# Patient Record
Sex: MALE | Race: WHITE | NOT HISPANIC OR LATINO | Employment: OTHER | URBAN - METROPOLITAN AREA
[De-identification: names, ages, dates, MRNs, and addresses within clinical notes are randomized per-mention and may not be internally consistent; named-entity substitution may affect disease eponyms.]

---

## 2018-08-30 ENCOUNTER — COMMUNICATION - HEALTHEAST (OUTPATIENT)
Dept: SCHEDULING | Facility: CLINIC | Age: 68
End: 2018-08-30

## 2019-03-25 ENCOUNTER — OFFICE VISIT - HEALTHEAST (OUTPATIENT)
Dept: FAMILY MEDICINE | Facility: CLINIC | Age: 69
End: 2019-03-25

## 2019-03-25 DIAGNOSIS — Z00.00 ROUTINE GENERAL MEDICAL EXAMINATION AT A HEALTH CARE FACILITY: ICD-10-CM

## 2019-03-25 DIAGNOSIS — K40.30 UNILATERAL INGUINAL HERNIA WITH OBSTRUCTION AND WITHOUT GANGRENE, RECURRENCE NOT SPECIFIED: ICD-10-CM

## 2019-03-25 DIAGNOSIS — R31.0 GROSS HEMATURIA: ICD-10-CM

## 2019-03-25 LAB
ALBUMIN SERPL-MCNC: 4.2 G/DL (ref 3.5–5)
ALBUMIN UR-MCNC: NEGATIVE MG/DL
ALP SERPL-CCNC: 72 U/L (ref 45–120)
ALT SERPL W P-5'-P-CCNC: 24 U/L (ref 0–45)
AMORPH CRY #/AREA URNS HPF: ABNORMAL /[HPF]
ANION GAP SERPL CALCULATED.3IONS-SCNC: 9 MMOL/L (ref 5–18)
APPEARANCE UR: ABNORMAL
AST SERPL W P-5'-P-CCNC: 32 U/L (ref 0–40)
BACTERIA #/AREA URNS HPF: ABNORMAL HPF
BILIRUB SERPL-MCNC: 1.7 MG/DL (ref 0–1)
BILIRUB UR QL STRIP: NEGATIVE
BUN SERPL-MCNC: 14 MG/DL (ref 8–22)
CALCIUM SERPL-MCNC: 10.9 MG/DL (ref 8.5–10.5)
CHLORIDE BLD-SCNC: 104 MMOL/L (ref 98–107)
CHOLEST SERPL-MCNC: 222 MG/DL
CO2 SERPL-SCNC: 28 MMOL/L (ref 22–31)
COLOR UR AUTO: YELLOW
CREAT SERPL-MCNC: 0.9 MG/DL (ref 0.7–1.3)
ERYTHROCYTE [DISTWIDTH] IN BLOOD BY AUTOMATED COUNT: 12 % (ref 11–14.5)
FASTING STATUS PATIENT QL REPORTED: YES
GFR SERPL CREATININE-BSD FRML MDRD: >60 ML/MIN/1.73M2
GLUCOSE BLD-MCNC: 79 MG/DL (ref 70–125)
GLUCOSE UR STRIP-MCNC: NEGATIVE MG/DL
HBA1C MFR BLD: 5.1 % (ref 3.5–6)
HCT VFR BLD AUTO: 50.7 % (ref 40–54)
HDLC SERPL-MCNC: 84 MG/DL
HGB BLD-MCNC: 16.6 G/DL (ref 14–18)
HGB UR QL STRIP: ABNORMAL
KETONES UR STRIP-MCNC: NEGATIVE MG/DL
LDLC SERPL CALC-MCNC: 125 MG/DL
LEUKOCYTE ESTERASE UR QL STRIP: NEGATIVE
MCH RBC QN AUTO: 31.8 PG (ref 27–34)
MCHC RBC AUTO-ENTMCNC: 32.8 G/DL (ref 32–36)
MCV RBC AUTO: 97 FL (ref 80–100)
NITRATE UR QL: NEGATIVE
PH UR STRIP: 7.5 [PH] (ref 5–8)
PLATELET # BLD AUTO: 271 THOU/UL (ref 140–440)
PMV BLD AUTO: 7 FL (ref 7–10)
POTASSIUM BLD-SCNC: 4.3 MMOL/L (ref 3.5–5)
PROT SERPL-MCNC: 6.9 G/DL (ref 6–8)
PSA SERPL-MCNC: 1.3 NG/ML (ref 0–4.5)
RBC # BLD AUTO: 5.24 MILL/UL (ref 4.4–6.2)
RBC #/AREA URNS AUTO: ABNORMAL HPF
SODIUM SERPL-SCNC: 141 MMOL/L (ref 136–145)
SP GR UR STRIP: 1.01 (ref 1–1.03)
SQUAMOUS #/AREA URNS AUTO: ABNORMAL LPF
TRIGL SERPL-MCNC: 64 MG/DL
UROBILINOGEN UR STRIP-ACNC: ABNORMAL
WBC #/AREA URNS AUTO: ABNORMAL HPF
WBC: 4.9 THOU/UL (ref 4–11)

## 2019-03-25 ASSESSMENT — MIFFLIN-ST. JEOR: SCORE: 1488.16

## 2019-03-26 ENCOUNTER — HOSPITAL ENCOUNTER (OUTPATIENT)
Dept: CT IMAGING | Facility: CLINIC | Age: 69
Discharge: HOME | End: 2019-03-26
Attending: FAMILY MEDICINE

## 2019-03-26 ENCOUNTER — COMMUNICATION - HEALTHEAST (OUTPATIENT)
Dept: FAMILY MEDICINE | Facility: CLINIC | Age: 69
End: 2019-03-26

## 2019-03-26 DIAGNOSIS — R31.0 GROSS HEMATURIA: ICD-10-CM

## 2019-04-02 ENCOUNTER — OFFICE VISIT - HEALTHEAST (OUTPATIENT)
Dept: SURGERY | Facility: CLINIC | Age: 69
End: 2019-04-02

## 2019-04-02 DIAGNOSIS — K40.90 NON-RECURRENT INGUINAL HERNIA WITHOUT OBSTRUCTION OR GANGRENE, UNSPECIFIED LATERALITY: ICD-10-CM

## 2019-04-02 ASSESSMENT — MIFFLIN-ST. JEOR: SCORE: 1507.21

## 2019-04-03 ENCOUNTER — OFFICE VISIT - HEALTHEAST (OUTPATIENT)
Dept: FAMILY MEDICINE | Facility: CLINIC | Age: 69
End: 2019-04-03

## 2019-04-03 DIAGNOSIS — Z12.11 SCREEN FOR COLON CANCER: ICD-10-CM

## 2019-04-03 DIAGNOSIS — N21.0 CALCIUM STONE OF BLADDER: ICD-10-CM

## 2019-04-30 ENCOUNTER — COMMUNICATION - HEALTHEAST (OUTPATIENT)
Dept: FAMILY MEDICINE | Facility: CLINIC | Age: 69
End: 2019-04-30

## 2019-05-02 ENCOUNTER — RECORDS - HEALTHEAST (OUTPATIENT)
Dept: ADMINISTRATIVE | Facility: OTHER | Age: 69
End: 2019-05-02

## 2019-05-03 ENCOUNTER — OFFICE VISIT - HEALTHEAST (OUTPATIENT)
Dept: FAMILY MEDICINE | Facility: CLINIC | Age: 69
End: 2019-05-03

## 2019-05-03 DIAGNOSIS — N21.0 BLADDER STONE: ICD-10-CM

## 2019-05-03 DIAGNOSIS — K40.90 HERNIA, INGUINAL, RIGHT: ICD-10-CM

## 2019-05-03 DIAGNOSIS — Z01.818 PREOPERATIVE EXAMINATION: ICD-10-CM

## 2019-05-03 LAB — HGB BLD-MCNC: 15.1 G/DL (ref 14–18)

## 2019-05-03 ASSESSMENT — MIFFLIN-ST. JEOR: SCORE: 1498.02

## 2019-05-06 LAB
ATRIAL RATE - MUSE: 62 BPM
DIASTOLIC BLOOD PRESSURE - MUSE: NORMAL MMHG
INTERPRETATION ECG - MUSE: NORMAL
P AXIS - MUSE: 60 DEGREES
PR INTERVAL - MUSE: 168 MS
QRS DURATION - MUSE: 82 MS
QT - MUSE: 370 MS
QTC - MUSE: 375 MS
R AXIS - MUSE: -25 DEGREES
SYSTOLIC BLOOD PRESSURE - MUSE: NORMAL MMHG
T AXIS - MUSE: 10 DEGREES
VENTRICULAR RATE- MUSE: 62 BPM

## 2019-05-07 ASSESSMENT — MIFFLIN-ST. JEOR: SCORE: 1497.57

## 2019-05-08 ENCOUNTER — SURGERY - HEALTHEAST (OUTPATIENT)
Dept: SURGERY | Facility: HOSPITAL | Age: 69
End: 2019-05-08

## 2019-05-08 ENCOUNTER — ANESTHESIA - HEALTHEAST (OUTPATIENT)
Dept: SURGERY | Facility: HOSPITAL | Age: 69
End: 2019-05-08

## 2019-05-20 ENCOUNTER — ANESTHESIA - HEALTHEAST (OUTPATIENT)
Dept: SURGERY | Facility: AMBULATORY SURGERY CENTER | Age: 69
End: 2019-05-20

## 2019-05-20 ASSESSMENT — MIFFLIN-ST. JEOR
SCORE: 1502.11
SCORE: 1502.11

## 2019-05-21 ENCOUNTER — HOSPITAL ENCOUNTER (OUTPATIENT)
Dept: SURGERY | Facility: AMBULATORY SURGERY CENTER | Age: 69
Discharge: HOME | End: 2019-05-21
Attending: SURGERY | Admitting: SURGERY

## 2019-05-21 ENCOUNTER — SURGERY - HEALTHEAST (OUTPATIENT)
Dept: SURGERY | Facility: AMBULATORY SURGERY CENTER | Age: 69
End: 2019-05-21

## 2019-05-21 DIAGNOSIS — K40.90 HERNIA, INGUINAL, RIGHT: ICD-10-CM

## 2019-05-21 RX ORDER — TRAMADOL HYDROCHLORIDE 50 MG/1
50-100 TABLET ORAL EVERY 6 HOURS PRN
Qty: 20 TABLET | Refills: 0 | Status: SHIPPED | OUTPATIENT
Start: 2019-05-21

## 2019-05-21 ASSESSMENT — MIFFLIN-ST. JEOR
SCORE: 1502.11
SCORE: 1502.11

## 2019-06-04 ENCOUNTER — OFFICE VISIT - HEALTHEAST (OUTPATIENT)
Dept: SURGERY | Facility: CLINIC | Age: 69
End: 2019-06-04

## 2019-06-04 DIAGNOSIS — S30.1XXA ABDOMINAL WALL SEROMA, INITIAL ENCOUNTER: ICD-10-CM

## 2019-06-04 ASSESSMENT — MIFFLIN-ST. JEOR: SCORE: 1496.66

## 2021-05-27 NOTE — PROGRESS NOTES
HPI:  I am consulted by Jame Plunkett MD in this 68 y.o. male regarding an inguinal hernia. He has noted this for the past year. He has discomfort and a bulge. The pain is mild.    No Known Allergies    No current outpatient medications on file.    Past Medical History:   Diagnosis Date     Concussion     3 yrs old       Past Surgical History:   Procedure Laterality Date     KIDNEY STONE SURGERY       VASECTOMY  1982       Social History     Socioeconomic History     Marital status:      Spouse name: Not on file     Number of children: Not on file     Years of education: Not on file     Highest education level: Not on file   Occupational History     Not on file   Social Needs     Financial resource strain: Not on file     Food insecurity:     Worry: Not on file     Inability: Not on file     Transportation needs:     Medical: Not on file     Non-medical: Not on file   Tobacco Use     Smoking status: Former Smoker     Last attempt to quit:      Years since quittin.2     Smokeless tobacco: Never Used   Substance and Sexual Activity     Alcohol use: No     Drug use: No     Sexual activity: Not on file   Lifestyle     Physical activity:     Days per week: Not on file     Minutes per session: Not on file     Stress: Not on file   Relationships     Social connections:     Talks on phone: Not on file     Gets together: Not on file     Attends Yazidism service: Not on file     Active member of club or organization: Not on file     Attends meetings of clubs or organizations: Not on file     Relationship status: Not on file     Intimate partner violence:     Fear of current or ex partner: Not on file     Emotionally abused: Not on file     Physically abused: Not on file     Forced sexual activity: Not on file   Other Topics Concern     Not on file   Social History Narrative     Not on file       Review of Systems - Negative except he has hematuria, and has just found out that he has bladder and kidney  "stones.    /64 (Patient Site: Right Arm, Patient Position: Sitting, Cuff Size: Adult Regular)   Pulse 66   Ht 5' 11.5\" (1.816 m)   Wt 157 lb (71.2 kg)   SpO2 99%   BMI 21.59 kg/m    Body mass index is 21.59 kg/m .    EXAM:  GENERAL: Thin male in no distress.  CARDIAC: RRR w/out murmur   CHEST/LUNG: Clear  ABDOMEN:  Right inguinal hernia. The left side is normal. Abdomen soft and nontender.  GENITAL: Both testicles descended without masses      Assessment/Plan: This patient has a  right inguinal hernia. I discussed this at length with him.  I went over conservative management as well as surgical treatment of this. I will plan on doing this via an open approach. I went over the small risks of surgery including but not limited to bleeding and infection. I discussed the expected recovery time as well. He will have an activity restriction for 4 weeks of no lifting over 20 pounds and no strenuous activity.   I suggested that he deal with his bladder and kidney stones before we repair the hernia. He will contact us to have this scheduled once his nephrolithiasis has been treated.      Anil Andres MD  [facility] Department of Surgery  "

## 2021-05-27 NOTE — PROGRESS NOTES
Assessment:     1. Screen for colon cancer  Ambulatory referral for Colonoscopy   2. Calcium stone of bladder  Ambulatory referral to Urology       Plan:     1. Screen for colon cancer  Referral placed  - Ambulatory referral for Colonoscopy    2. Calcium stone of bladder  Patient will need this large bladder stone resolved before he can have his hernia repaired referral will be made  - Ambulatory referral to Urology      Subjective:   Patient is having intermittent hematuria especially after he walks or exercises and I think it is related to the large 2 cm bladder stone; he will need urological consultation and options explained.  He was seen by Dr. Andres of general surgery for his large inguinal hernia which will have to be deferred until the bladder stone problem is resolved.  Patient wanted to try natural methods to dissolve the stone but I said this is impossible based on the size of it and the fact that it is causing such symptoms such as microhematuria and nocturia and urinary frequency.  Patient understands referral will be made options were discussed.25 minute visit    Review of Systems: A complete 14 point review of systems was obtained and is negative or as stated in the history of present illness.    Past Medical History:   Diagnosis Date     Concussion     3 yrs old     Family History   Problem Relation Age of Onset     Stroke Mother      Heart disease Mother      Stroke Father      Past Surgical History:   Procedure Laterality Date     KIDNEY STONE SURGERY       VASECTOMY       Social History     Tobacco Use     Smoking status: Former Smoker     Last attempt to quit:      Years since quittin.2     Smokeless tobacco: Never Used   Substance Use Topics     Alcohol use: No     Drug use: No         Objective:   /60   Pulse 72   Wt 156 lb 12.8 oz (71.1 kg)   BMI 21.56 kg/m      General Appearance:  Alert, cooperative, no distress  Head:  Normocephalic, no obvious abnormality  Ears: TM  anatomy normal  Eyes:  PERRL, EOM's intact, conjunctiva and corneas clear  Nose:  Nares symmetrical, septum midline, mucosa pink, no sinus tenderness  Throat:  Lips, tongue, and mucosa are moist, pink, and intact  Neck:  Supple, symmetrical, trachea midline, no adenopathy; thyroid: no enlargement, symmetric,no tenderness/mass/nodules; no carotid bruit, no JVD  Back:  Symmetrical, no curvature, ROM normal, no CVA tenderness  Chest/Breast:  No mass or tenderness  Lungs:  Clear to auscultation bilaterally, respirations unlabored   Heart:  Normal PMI, regular rate & rhythm, S1 and S2 normal, no murmurs, rubs, or gallops  Abdomen:  Soft, non-tender, bowel sounds active all four quadrants, no mass, or organomegaly  Musculoskeletal:  Tone and strength strong and symmetrical, all extremities; large right inguinal hernia as before  Lymphatic:  No adenopathy  Skin/Hair/Nails:  Skin warm, dry, and intact, no rashes  Neurologic:  Alert and oriented x3, no cranial nerve deficits, normal strength and tone, gait steady  Extremities:  No edema.  Tamar's sign negative.    Genitourinary: deferred  Pulses:  Equal bilaterally     I have had an Advance Directives discussion with the patient.      This note has been dictated using voice recognition software. Any grammatical or context distortions are unintentional and inherent to the the software.

## 2021-05-27 NOTE — PROGRESS NOTES
Assessment:     1. Unilateral inguinal hernia with obstruction and without gangrene, recurrence not specified  Comprehensive Metabolic Panel    Glycosylated Hemoglobin A1c    HM2(CBC w/o Differential)    Lipid Cascade    Urinalysis-UC if Indicated    Ambulatory referral to General Surgery   2. Gross hematuria  Comprehensive Metabolic Panel    Glycosylated Hemoglobin A1c    HM2(CBC w/o Differential)    PSA (Prostatic-Specific Antigen), Annual Screen    CT Abdomen Pelvis Without Oral With Without IV Contrast   3. Routine general medical examination at a health care facility         Plan:     1. Unilateral inguinal hernia with obstruction and without gangrene, recurrence not specified  Patient will not be a surgical candidate for laparoscopic repair of this hernia but he has had hematuria for a year which will need complete investigation prior to surgery following workup as indicated  - Comprehensive Metabolic Panel  - Glycosylated Hemoglobin A1c  - HM2(CBC w/o Differential)  - Lipid Cascade  - Urinalysis-UC if Indicated  - Ambulatory referral to General Surgery    2. Gross hematuria  Patient has had hematuria daily 2-3 times especially after he walks; past medical history of multiple kidney stones but asymptomatic and no problems for 12 years  - Comprehensive Metabolic Panel  - Glycosylated Hemoglobin A1c  - HM2(CBC w/o Differential)  - PSA (Prostatic-Specific Antigen), Annual Screen  - CT Abdomen Pelvis Without Oral With Without IV Contrast; Future    3. Routine general medical examination at a health care facility  We will follow the patient up in 1 week after test results and for follow-up of CT and to complete workup of hematuria      Subjective:   Patient initially made this appointment for a bulging mass in his right groin.  The patient works as a  group home.  Is noticed some pain discomfort right lower groin area.  Patient has an obvious indirect inguinal hernia which is reducible which  "will need laparoscopic repair with mesh.  On taking the past medical history patient has had multiple ureteral lifts and nephroliths in the past in  and underwent BRCA now for removal of these with stent placement apparently one stone was left behind.  This occurred in .  Patient's been a cement antibiotic since that time.  But on closer questioning patient admits that anytime he walks more than 2 or 3 blocks he does develop hematuria.  Patient also has nocturia 2-3 times.  He states that he does get gross hematuria almost on a daily basis.  But this will need investigation.  He is been feeling well his weight is down a bit.  The patient has not been in here in 5 years last physical was time.  Patient was seen by Dr. Plunkett.  Patient will need a surgical consultation for this hernia.  We need to workup this hematuria.  I will start with a CT scan and proceed from there we may need urological consultation for the hematuria hopefully we do not find mass-effect on CT.  No family history: Or breast cancer.  Patient has no diabetes or diabetic first-degree relatives.  He has been experiencing some mild polyuria lately.  Plan here is to do complete blood work including PSA which was baseline normal 6 years ago.  I want to follow him 1 week from today with test results will get BUN/creatinine for clearance.  All medical questions asked and answered chart was reviewed follow-up as indicated.40 min utes.    Review of Systems: A complete 14 point review of systems was obtained and is negative or as stated in the history of present illness.    No past medical history on file.  No family history on file.  No past surgical history on file.  Social History     Tobacco Use     Smoking status: Former Smoker     Last attempt to quit:      Years since quittin.2     Smokeless tobacco: Never Used   Substance Use Topics     Alcohol use: No     Drug use: No         Objective:   /70   Pulse 70   Ht 5' 11.5\" (1.816 " m)   Wt 152 lb 12.8 oz (69.3 kg)   SpO2 98%   BMI 21.01 kg/m      General Appearance:  Alert, cooperative, no distress  Head:  Normocephalic, no obvious abnormality  Ears: TM anatomy normal  Eyes:  PERRL, EOM's intact, conjunctiva and corneas clear  Nose:  Nares symmetrical, septum midline, mucosa pink, no sinus tenderness  Throat:  Lips, tongue, and mucosa are moist, pink, and intact  Neck:  Supple, symmetrical, trachea midline, no adenopathy; thyroid: no enlargement, symmetric,no tenderness/mass/nodules; no carotid bruit, no JVD  Back:  Symmetrical, no curvature, ROM normal, no CVA tenderness  Chest/Breast:  No mass or tenderness  Lungs:  Clear to auscultation bilaterally, respirations unlabored   Heart:  Normal PMI, regular rate & rhythm, S1 and S2 normal, no murmurs, rubs, or gallops  Abdomen:  Soft, non-tender, bowel sounds active all four quadrants, no mass, or organomegaly patient has a large right indirect inguinal hernia which is reducible  Musculoskeletal:  Tone and strength strong and symmetrical, all extremities  Lymphatic:  No adenopathy  Skin/Hair/Nails:  Skin warm, dry, and intact, no rashes  Neurologic:  Alert and oriented x3, no cranial nerve deficits, normal strength and tone, gait steady  Extremities:  No edema.  Tamar's sign negative.    Genitourinary: deferred  Pulses:  Equal bilaterally     I have had an Advance Directives discussion with the patient.      This note has been dictated using voice recognition software. Any grammatical or context distortions are unintentional and inherent to the the software.

## 2021-05-28 NOTE — PROGRESS NOTES
Preoperative Exam    Scheduled Procedure: CYSTOLITHOLAPAXY WITH HOLMIUM LASER, BLADDER STONE EXTRACTION, WESTFALL PLACEMENT  Surgery Date:  5/8/19  Surgery Location: [facility], fax [phone]    Surgeon:  Dr. Moises Martini     Assessment/Plan:     1. Preoperative examination  2. Bladder stone  Preoperative exam completed today.  Reviewed patient's allergies and medications.  EKG performed and interpreted in clinic today.  This is normal without any significant findings.  Hemoglobin is stable at 15.1.  No contraindications to the procedure were identified today.  Patient may proceed with scheduled procedure with choice of anesthesia.  - Electrocardiogram Perform and Read  - Hemoglobin    3. Hernia, inguinal, right  Stable, patient has hernia repair scheduled in upcoming weeks.    Surgical Procedure Risk: Low (reported cardiac risk generally < 1%)  Have you had prior anesthesia?: Yes  Have you or any family members had a previous anesthesia reaction:  Yes: takes awhile to wake up   Do you or any family members have a history of a clotting or bleeding disorder?: No  Cardiac Risk Assessment: no increased risk for major cardiac complications    Patient approved for surgery with general or local anesthesia.    Please Note:  None    Functional Status: Independent  Patient plans to recover at home with family.     Subjective:      Paul Esposito is a 68 y.o. male who presents for a preoperative consultation.  Patient will be undergoing bladder stone extraction.  Patient noticed hematuria after going for long walks.  CT indicated a significantly large bladder stone measuring roughly 2-1/2 cm around.  Stone has not decreased in size and therefore surgery is recommended to remove this.  He denies any other urinary symptoms.  He has history of kidney stones as well.  Denies any pain or concerns in this regard.  Patient also has a right inguinal hernia.  He plans to have this repaired in upcoming weeks but was advised to  have the bladder stone removed prior to attempting hernia procedure.  He otherwise is healthy 68-year-old male.  No significant past medical history.  Patient does not take any regular medications.  He has undergone general anesthesia previously and denies having any major side effects.  Does note that it took him a while to come out of the anesthesia and is somewhat concerned about this.  He has mentioned this to his surgeon.  He otherwise feels well today.  Denies any recent illness.  No chest pain or shortness of breath.  He does not have any additional concerns today.    All other systems reviewed and are negative, other than those listed in the HPI.    Pertinent History  Do you have difficulty breathing or chest pain after walking up a flight of stairs: No  History of obstructive sleep apnea: No  Steroid use in the last 6 months: No  Frequent Aspirin/NSAID use: No  Prior Blood Transfusion: Yes: when he was born   Prior Blood Transfusion Reaction: unknown   If for some reason prior to, during or after the procedure, if it is medically indicated, would you be willing to have a blood transfusion?:  There is no transfusion refusal.    No current outpatient medications on file.     No current facility-administered medications for this visit.         No Known Allergies    Patient Active Problem List   Diagnosis     Tinea Pedis     Migraine Headache     Cervical Neuritis       Past Medical History:   Diagnosis Date     Concussion     3 yrs old       Past Surgical History:   Procedure Laterality Date     KIDNEY STONE SURGERY       VASECTOMY  1982       Social History     Socioeconomic History     Marital status:      Spouse name: Not on file     Number of children: Not on file     Years of education: Not on file     Highest education level: Not on file   Occupational History     Not on file   Social Needs     Financial resource strain: Not on file     Food insecurity:     Worry: Not on file     Inability: Not on  "file     Transportation needs:     Medical: Not on file     Non-medical: Not on file   Tobacco Use     Smoking status: Former Smoker     Last attempt to quit:      Years since quittin.3     Smokeless tobacco: Never Used   Substance and Sexual Activity     Alcohol use: No     Drug use: No     Sexual activity: Not on file   Lifestyle     Physical activity:     Days per week: Not on file     Minutes per session: Not on file     Stress: Not on file   Relationships     Social connections:     Talks on phone: Not on file     Gets together: Not on file     Attends Mormonism service: Not on file     Active member of club or organization: Not on file     Attends meetings of clubs or organizations: Not on file     Relationship status: Not on file     Intimate partner violence:     Fear of current or ex partner: Not on file     Emotionally abused: Not on file     Physically abused: Not on file     Forced sexual activity: Not on file   Other Topics Concern     Not on file   Social History Narrative     Not on file       Patient Care Team:  Jame Plunkett MD as PCP - General          Objective:     Vitals:    19 0814   BP: 100/60   Pulse: 66   Weight: 154 lb 1.6 oz (69.9 kg)   Height: 5' 11.75\" (1.822 m)         Physical Exam:    General Appearance:    Alert, cooperative, no distress, appears stated age.     Head:    Normocephalic, without obvious abnormality, atraumatic   Eyes:    PERRL, conjunctiva/corneas clear, EOM's intact, fundi     benign, both eyes        Ears:    Normal TM's and external ear canals, both ears   Nose:   Nares normal, septum midline, mucosa normal, no drainage    or sinus tenderness   Throat:   Lips, mucosa, and tongue normal; teeth and gums normal   Neck:   Supple, symmetrical, trachea midline, no adenopathy;        thyroid:  No enlargement/tenderness/nodules; no carotid    bruit or JVD   Back:     Symmetric, no curvature, ROM normal, no CVA tenderness   Lungs:     Clear to " auscultation bilaterally, respirations unlabored   Chest wall:    No tenderness or deformity   Heart:    Regular rate and rhythm, S1 and S2 normal, no murmur, rub   or gallop   Abdomen:     Soft, non-tender, bowel sounds active all four quadrants,     no masses, no organomegaly.     Genitalia:    Deferred per patient.   Rectal:   Deferred per patient.   Extremities:   Extremities normal, atraumatic, no cyanosis or edema   Pulses:   2+ and symmetric all extremities   Skin:   Skin color, texture, turgor normal, no rashes or lesions   Lymph nodes:   Cervical, supraclavicular, and axillary nodes normal   Neurologic:   CNII-XII intact. Normal strength, sensation and reflexes       throughout     There are no Patient Instructions on file for this visit.    EKG: Obtained and interpreted in clinic.  EKG normal without any significant findings.     Labs:  Recent Results (from the past 24 hour(s))   Electrocardiogram Perform and Read    Collection Time: 05/03/19  8:22 AM   Result Value Ref Range    SYSTOLIC BLOOD PRESSURE  mmHg    DIASTOLIC BLOOD PRESSURE  mmHg    VENTRICULAR RATE 62 BPM    ATRIAL RATE 62 BPM    P-R INTERVAL 168 ms    QRS DURATION 82 ms    Q-T INTERVAL 370 ms    QTC CALCULATION (BEZET) 375 ms    P Axis 60 degrees    R AXIS -25 degrees    T AXIS 10 degrees    MUSE DIAGNOSIS       Normal sinus rhythm  Normal ECG  When compared with ECG of 23-JAN-2013 19:37,  Vent. rate has decreased BY  30 BPM     Hemoglobin    Collection Time: 05/03/19  8:58 AM   Result Value Ref Range    Hemoglobin 15.1 14.0 - 18.0 g/dL       Immunization History   Administered Date(s) Administered     DT (pediatric) 05/01/1998     Hep A, historic 07/23/2007, 11/11/2009     Td,adult,historic,unspecified 11/11/2009     Tdap 11/11/2009           Electronically signed by Annalee Grimaldo CNP 05/03/19 8:15 AM

## 2021-05-28 NOTE — ANESTHESIA POSTPROCEDURE EVALUATION
Patient: Paul Esposito  CYSTOLITHOLAPAXY WITH HOLMIUM LASER, BLADDER STONE EXTRACTION, WESTFALL PLACEMENT  Anesthesia type: general    Patient location: home  Last vitals:   Vitals Value Taken Time   /76 5/8/2019  5:30 PM   Temp 36.8  C (98.3  F) 5/8/2019  5:30 PM   Pulse 74 5/8/2019  5:30 PM   Resp 16 5/8/2019  5:30 PM   SpO2 98 % 5/8/2019  5:30 PM     Post vital signs: stable  Level of consciousness: awake and responds to simple questions  Post-anesthesia pain: pain controlled  Post-anesthesia nausea and vomiting: no  Pulmonary: unassisted, return to baseline  Cardiovascular: stable and blood pressure at baseline  Hydration: adequate  Anesthetic events: no    QCDR Measures:  ASA# 11 - Betzaida-op Cardiac Arrest: ASA11B - Patient did NOT experience unanticipated cardiac arrest  ASA# 12 - Betzaida-op Mortality Rate: ASA12B - Patient did NOT die  ASA# 13 - PACU Re-Intubation Rate: ASA13B - Patient did NOT require a new airway mgmt  ASA# 10 - Composite Anes Safety: ASA10A - No serious adverse event    Additional Notes:

## 2021-05-28 NOTE — TELEPHONE ENCOUNTER
Called pt. And left voicemail to give us a call back to schedule a pre-op physical @ [facility].  Please schedule pt. Before his surgery date of 5/8.     Thanks.

## 2021-05-28 NOTE — TELEPHONE ENCOUNTER
New Appointment Needed  What is the reason for the visit:   preop  Pre-Op Appt Request  When is the surgery? :  5/8  Where is the surgery?:      Who is the surgeon? :  Dr. Martini  What type of surgery is being done?: bladder stone removal  Provider Preference: Dr. Davis  How soon do you need to be seen?: this week. thusrday morning he has an ultrasound and will be available after that    Waitlist offered?: No  Okay to leave a detailed message:  Yes

## 2021-05-28 NOTE — ANESTHESIA CARE TRANSFER NOTE
Last vitals:   Vitals:    05/08/19 1558   BP: 155/75   Pulse: 72   Resp: 16   Temp: 37.3  C (99.1  F)   SpO2: 100%     Patient's level of consciousness is drowsy  Spontaneous respirations: yes  Maintains airway independently: yes  Dentition unchanged: yes  Oropharynx: oropharynx clear of all foreign objects    QCDR Measures:  ASA# 20 - Surgical Safety Checklist: WHO surgical safety checklist completed prior to induction    PQRS# 430 - Adult PONV Prevention: 4558F - Pt received => 2 anti-emetic agents (different classes) preop & intraop  ASA# 8 - Peds PONV Prevention: NA - Not pediatric patient, not GA or 2 or more risk factors NOT present  PQRS# 424 - Betzaida-op Temp Management: 4559F - At least one body temp DOCUMENTED => 35.5C or 95.9F within required timeframe  PQRS# 426 - PACU Transfer Protocol: - Transfer of care checklist used  ASA# 14 - Acute Post-op Pain: ASA14B - Patient did NOT experience pain >= 7 out of 10

## 2021-05-29 ENCOUNTER — RECORDS - HEALTHEAST (OUTPATIENT)
Dept: ADMINISTRATIVE | Facility: CLINIC | Age: 71
End: 2021-05-29

## 2021-05-29 NOTE — PROGRESS NOTES
"HPI: Pt is here for follow up of a right inguinal hernia.  He is doing well. His appetite is good, and bowel function regular.  No fevers or chills. Ambulating without problems. He has noted a bulge at the incision.    Allergies, Medications, Social History, Past Medical History and Past Surgical History were reviewed and are noted in the chart.    /64   Pulse 62   Resp 14   Ht 5' 11.75\" (1.822 m)   Wt 153 lb 12.8 oz (69.8 kg)   SpO2 98%   BMI 21.00 kg/m        EXAM: This is a  68 y.o. male in no distress  GENERAL: Appears well  CHEST clear  CVS S1S2 NSR  ABDOMEN: Soft, non-tender.   GROIN: incision well healed. Bulge at incision with fluctuance. No bulging on cough. I aspirated a 25 cc seroma.      Assessment/Plan: Paul Esposito is following up after inguinal hernia repair. Doing well. We discussed the seroma, and I mentioned it may need further aspiration if it returns and is symptomatic.  He had been under the impression he was getting a laparoscopic repair. I reviewed my notes, which mentioned an open repair. He had a procedure prior to surgery, and then called in the information to schedule it; if we had planned on a laparoscopic repair, I would have written that on the card that I gave him at the original visit. So I believe there was either a misunderstanding or a miscommunication, but both my notes and the scheduled procedure were for an open repair. He understood this discussion.  He will return if he has any further problems.    Anil Andres MD  [facility] Department of Surgery  "

## 2021-05-29 NOTE — ANESTHESIA CARE TRANSFER NOTE
Last vitals:   Vitals:    05/21/19 1326   BP: 119/56   Pulse: 77   Resp: 15   Temp: 37.1  C (98.8  F)   SpO2:    sats: 100% on 6 L SFM    Patient's level of consciousness is drowsy  Spontaneous respirations: yes  Maintains airway independently: yes  Dentition unchanged: yes  Oropharynx: oropharynx clear of all foreign objects    QCDR Measures:  ASA# 20 - Surgical Safety Checklist: WHO surgical safety checklist completed prior to induction    PQRS# 430 - Adult PONV Prevention: 4558F - Pt received => 2 anti-emetic agents (different classes) preop & intraop  ASA# 8 - Peds PONV Prevention: NA - Not pediatric patient, not GA or 2 or more risk factors NOT present  PQRS# 424 - Betzaida-op Temp Management: 4559F - At least one body temp DOCUMENTED => 35.5C or 95.9F within required timeframe  PQRS# 426 - PACU Transfer Protocol: - Transfer of care checklist used  ASA# 14 - Acute Post-op Pain: ASA14B - Patient did NOT experience pain >= 7 out of 10

## 2021-05-29 NOTE — ANESTHESIA POSTPROCEDURE EVALUATION
Patient: Paul Esposito  RIGHT INGUINAL HERNIA REPAIR  Anesthesia type: general    Patient location: PACU  Last vitals: No vitals data found for the desired time range.    Post vital signs: stable  Level of consciousness: awake and responds to simple questions  Post-anesthesia pain: pain controlled  Post-anesthesia nausea and vomiting: no  Pulmonary: unassisted, return to baseline  Cardiovascular: stable and blood pressure at baseline  Hydration: adequate  Anesthetic events: no    QCDR Measures:  ASA# 11 - Betzaida-op Cardiac Arrest: ASA11B - Patient did NOT experience unanticipated cardiac arrest  ASA# 12 - Betzaida-op Mortality Rate: ASA12B - Patient did NOT die  ASA# 13 - PACU Re-Intubation Rate: NA - No ETT / LMA used for case  ASA# 10 - Composite Anes Safety: ASA10A - No serious adverse event    Additional Notes:

## 2021-05-29 NOTE — OP NOTE
Operative Note    Name:  Paul Esposito  PCP:  Jame Plunkett MD  Procedure Date:  5/21/2019      RIGHT INGUINAL HERNIA REPAIR (Right)    Pre-Procedure Diagnosis:  Inguinal hernia [K40.90]     Post-Procedure Diagnosis:    Same    Surgeon(s):  Anil Andres MD      Anesthesia Type:  MAC    Past Medical History:   Diagnosis Date     Bladder stone      Cervical neuritis      Concussion     3 yrs old     Concussion     at age 3     History of anesthesia complications     slow to wake up     History of transfusion      at childbirth     Hx of migraines     visual     Kidney stone     h/o       Patient Active Problem List    Diagnosis Date Noted     Preoperative examination 05/03/2019     Bladder stone 05/03/2019     Hernia, inguinal, right 05/03/2019     Tinea Pedis      Migraine Headache      Cervical Neuritis        Findings:  Indirect inguinal hernia    Operative Report:    The procedure was performed under 1% plain lidocaine local anesthetic plus sedation. I made an inguinal incision and carried it down to the fascia of external oblique, which I opened in the direction of its fibers.  I identified the ileo-inguinal nerve, dissected it out, and retracted it out of the field of operation.  I encircled the cord at the pubic tubercle with a Penrose drain, and skeletonized the cord by dividing the cremasteric muscle. I found an indirect inguinal hernia and dissected it away from surrounding tissues.  I placed a small PerFix plug into the defect, and sutured it to the margins of the defect with 3-0 silk sutures.  I placed a polypropylene onlay mesh over the inguinal floor and anchored it with 3-0 Silks.  I assured hemostasis, then reconstituted the external oblique fascia with running 3-0 Vicryl.  I injected 1/4% Marcaine at the fascia, subcutaneous and subcuticular levels, then closed the incision with a running 4-0 Vicryl subcuticular suture.    The patient tolerated the procedure well.  There were no  complications.  The blood loss was minimal and the patient left the operating room in stable condition.  Needle and sponge counts were correct at the end of the operation.    Estimated Blood Loss:   3 mL from 5/21/2019 12:29 PM to 5/21/2019  1:26 PM    Specimens:    None         Complications:    None    Anil Andres     Date: 5/21/2019  Time: 1:34 PM

## 2021-05-29 NOTE — ANESTHESIA PREPROCEDURE EVALUATION
Anesthesia Evaluation      Patient summary reviewed   History of anesthetic complications     Airway   Mallampati: I  Neck ROM: full   Pulmonary - negative ROS and normal exam   (+) a smoker                         Cardiovascular - negative ROS and normal exam  Exercise tolerance: > or = 4 METS   Neuro/Psych - negative ROS   (-) no neuromuscular disease    Endo/Other - negative ROS      GI/Hepatic/Renal - negative ROS   (-) renal disease     Other findings: Possible slow emergence, had stone surgery at [facility] about 2006. No problems with recent  surgery at [facility]- reviewed.          Dental - normal exam                          Anesthesia Plan  Planned anesthetic: MAC    ASA 2     Anesthetic plan and risks discussed with: patient    Post-op plan: routine recovery

## 2021-05-30 ENCOUNTER — RECORDS - HEALTHEAST (OUTPATIENT)
Dept: ADMINISTRATIVE | Facility: CLINIC | Age: 71
End: 2021-05-30

## 2021-06-02 VITALS — HEIGHT: 72 IN | BODY MASS INDEX: 20.7 KG/M2 | WEIGHT: 152.8 LBS

## 2021-06-02 VITALS — WEIGHT: 157 LBS | BODY MASS INDEX: 21.26 KG/M2 | HEIGHT: 72 IN

## 2021-06-02 VITALS — BODY MASS INDEX: 21.56 KG/M2 | WEIGHT: 156.8 LBS

## 2021-06-03 VITALS — WEIGHT: 154.1 LBS | BODY MASS INDEX: 20.87 KG/M2 | HEIGHT: 72 IN

## 2021-06-03 VITALS — WEIGHT: 154 LBS | HEIGHT: 72 IN | BODY MASS INDEX: 20.86 KG/M2

## 2021-06-03 VITALS
WEIGHT: 155 LBS | HEIGHT: 72 IN | HEIGHT: 72 IN | WEIGHT: 155 LBS | BODY MASS INDEX: 20.99 KG/M2 | BODY MASS INDEX: 20.99 KG/M2

## 2021-06-03 VITALS — BODY MASS INDEX: 20.83 KG/M2 | HEIGHT: 72 IN | WEIGHT: 153.8 LBS

## 2021-06-16 PROBLEM — K40.90 HERNIA, INGUINAL, RIGHT: Status: ACTIVE | Noted: 2019-05-03

## 2021-06-16 PROBLEM — N21.0 BLADDER STONE: Status: ACTIVE | Noted: 2019-05-03

## 2021-06-16 PROBLEM — Z01.818 PREOPERATIVE EXAMINATION: Status: ACTIVE | Noted: 2019-05-03

## 2021-06-19 NOTE — LETTER
Letter by River Davis MD at      Author: River Davis MD Service: -- Author Type: --    Filed:  Encounter Date: 3/26/2019 Status: (Other)         Paul Esposito  [address] [facility] [place] [address]             March 26, 2019         Dear Mr. Esposito,    Below are the results from your recent visit:    Resulted Orders   Comprehensive Metabolic Panel   Result Value Ref Range    Sodium 141 136 - 145 mmol/L    Potassium 4.3 3.5 - 5.0 mmol/L    Chloride 104 98 - 107 mmol/L    CO2 28 22 - 31 mmol/L    Anion Gap, Calculation 9 5 - 18 mmol/L    Glucose 79 70 - 125 mg/dL    BUN 14 8 - 22 mg/dL    Creatinine 0.90 0.70 - 1.30 mg/dL    GFR MDRD Af Amer >60 >60 mL/min/1.73m2    GFR MDRD Non Af Amer >60 >60 mL/min/1.73m2    Bilirubin, Total 1.7 (H) 0.0 - 1.0 mg/dL    Calcium 10.9 (H) 8.5 - 10.5 mg/dL    Protein, Total 6.9 6.0 - 8.0 g/dL    Albumin 4.2 3.5 - 5.0 g/dL    Alkaline Phosphatase 72 45 - 120 U/L    AST 32 0 - 40 U/L    ALT 24 0 - 45 U/L    Narrative    Fasting Glucose reference range is 70-99 mg/dL per  American Diabetes Association (ADA) guidelines.   Glycosylated Hemoglobin A1c   Result Value Ref Range    Hemoglobin A1c 5.1 3.5 - 6.0 %   HM2(CBC w/o Differential)   Result Value Ref Range    WBC 4.9 4.0 - 11.0 thou/uL    RBC 5.24 4.40 - 6.20 mill/uL    Hemoglobin 16.6 14.0 - 18.0 g/dL    Hematocrit 50.7 40.0 - 54.0 %    MCV 97 80 - 100 fL    MCH 31.8 27.0 - 34.0 pg    MCHC 32.8 32.0 - 36.0 g/dL    RDW 12.0 11.0 - 14.5 %    Platelets 271 140 - 440 thou/uL    MPV 7.0 7.0 - 10.0 fL   Lipid Cascade   Result Value Ref Range    Cholesterol 222 (H) <=199 mg/dL    Triglycerides 64 <=149 mg/dL    HDL Cholesterol 84 >=40 mg/dL    LDL Calculated 125 <=129 mg/dL    Patient Fasting > 8hrs? Yes    PSA (Prostatic-Specific Antigen), Annual Screen   Result Value Ref Range    PSA 1.3 0.0 - 4.5 ng/mL    Narrative    Method is Abbott Prostate-Specific Antigen (PSA)  Standard-WHO 1st International (90:10)   Urinalysis-UC if  Indicated   Result Value Ref Range    Color, UA Yellow Colorless, Yellow, Straw, Light Yellow    Clarity, UA Slightly Cloudy (!) Clear    Glucose, UA Negative Negative    Bilirubin, UA Negative Negative    Ketones, UA Negative Negative    Specific Gravity, UA 1.015 1.005 - 1.030    Blood, UA Trace (!) Negative    pH, UA 7.5 5.0 - 8.0    Protein, UA Negative Negative mg/dL    Urobilinogen, UA 0.2 E.U./dL 0.2 E.U./dL, 1.0 E.U./dL    Nitrite, UA Negative Negative    Leukocytes, UA Negative Negative    Bacteria, UA None Seen None Seen hpf    RBC, UA 0-2 None Seen, 0-2 hpf    WBC, UA None Seen None Seen, 0-5 hpf    Squam Epithel, UA 0-5 None Seen, 0-5 lpf    Amorphous, UA Few (!) None Seen    Narrative    UC not indicated       Blood tests good;large stone in your bladder;small stones in one kidney;come in to discuss as we planned    Please call with questions or contact us using "[employer]t.    Sincerely,        Electronically signed by River Davis MD

## 2021-06-26 ENCOUNTER — HEALTH MAINTENANCE LETTER (OUTPATIENT)
Age: 71
End: 2021-06-26

## 2021-09-08 ENCOUNTER — TRANSFERRED RECORDS (OUTPATIENT)
Dept: HEALTH INFORMATION MANAGEMENT | Facility: CLINIC | Age: 71
End: 2021-09-08

## 2021-10-16 ENCOUNTER — HEALTH MAINTENANCE LETTER (OUTPATIENT)
Age: 71
End: 2021-10-16

## 2022-07-23 ENCOUNTER — HEALTH MAINTENANCE LETTER (OUTPATIENT)
Age: 72
End: 2022-07-23

## 2022-10-01 ENCOUNTER — HEALTH MAINTENANCE LETTER (OUTPATIENT)
Age: 72
End: 2022-10-01

## 2023-08-06 ENCOUNTER — HEALTH MAINTENANCE LETTER (OUTPATIENT)
Age: 73
End: 2023-08-06

## 2024-09-29 ENCOUNTER — HEALTH MAINTENANCE LETTER (OUTPATIENT)
Age: 74
End: 2024-09-29

## 2024-10-11 ENCOUNTER — HOSPITAL ENCOUNTER (EMERGENCY)
Facility: CLINIC | Age: 74
Discharge: HOME | End: 2024-10-11
Admitting: STUDENT IN AN ORGANIZED HEALTH CARE EDUCATION/TRAINING PROGRAM
Payer: COMMERCIAL

## 2024-10-11 VITALS
SYSTOLIC BLOOD PRESSURE: 140 MMHG | RESPIRATION RATE: 18 BRPM | OXYGEN SATURATION: 98 % | WEIGHT: 165 LBS | HEIGHT: 72 IN | HEART RATE: 68 BPM | TEMPERATURE: 97.7 F | BODY MASS INDEX: 22.35 KG/M2 | DIASTOLIC BLOOD PRESSURE: 81 MMHG

## 2024-10-11 DIAGNOSIS — S01.511A LIP LACERATION, INITIAL ENCOUNTER: ICD-10-CM

## 2024-10-11 PROCEDURE — 99283 EMERGENCY DEPT VISIT LOW MDM: CPT

## 2024-10-11 PROCEDURE — 12051 INTMD RPR FACE/MM 2.5 CM/<: CPT

## 2024-10-11 PROCEDURE — 250N000013 HC RX MED GY IP 250 OP 250 PS 637: Performed by: STUDENT IN AN ORGANIZED HEALTH CARE EDUCATION/TRAINING PROGRAM

## 2024-10-11 RX ORDER — CEPHALEXIN 500 MG/1
500 CAPSULE ORAL 2 TIMES DAILY
Qty: 10 CAPSULE | Refills: 0 | Status: SHIPPED | OUTPATIENT
Start: 2024-10-11 | End: 2024-10-17

## 2024-10-11 RX ORDER — CEPHALEXIN 500 MG/1
500 CAPSULE ORAL ONCE
Status: COMPLETED | OUTPATIENT
Start: 2024-10-11 | End: 2024-10-11

## 2024-10-11 RX ADMIN — CEPHALEXIN 500 MG: 500 CAPSULE ORAL at 15:56

## 2024-10-11 ASSESSMENT — ACTIVITIES OF DAILY LIVING (ADL)
ADLS_ACUITY_SCORE: 33
ADLS_ACUITY_SCORE: 35
ADLS_ACUITY_SCORE: 35

## 2024-10-11 ASSESSMENT — COLUMBIA-SUICIDE SEVERITY RATING SCALE - C-SSRS
6. HAVE YOU EVER DONE ANYTHING, STARTED TO DO ANYTHING, OR PREPARED TO DO ANYTHING TO END YOUR LIFE?: NO
1. IN THE PAST MONTH, HAVE YOU WISHED YOU WERE DEAD OR WISHED YOU COULD GO TO SLEEP AND NOT WAKE UP?: NO
2. HAVE YOU ACTUALLY HAD ANY THOUGHTS OF KILLING YOURSELF IN THE PAST MONTH?: NO

## 2024-10-11 NOTE — ED PROVIDER NOTES
Emergency Department Encounter   NAME: Paul Esposito ; AGE: 73 year old male ; YOB: 1950 ; MRN: [id number] ; PCP: Jame Plunkett   ED PROVIDER: Barbara Medina PA-C    Evaluation Date & Time:   10/11/2024  2:00 PM    CHIEF COMPLAINT:  Lip Laceration        Impression and Plan   FINAL IMPRESSION:    ICD-10-CM    1. Lip laceration, initial encounter  S01.511A           ED Course and Medical Decision Making  Paul is a 73 year old male with PMH of migraine headaches presenting to the emergency department for evaluation of a laceration to his upper lip that occurred about an hour and a half prior to arrival.  He states he was trimming a branch and it fell down and struck him in the mouth.  Denies any pain currently.  No meds prior to arrival.  He denies any loss of consciousness, headache, or vomiting.  He is not on any blood thinners.  Denies any loose teeth or pain in the mouth or face.  Last tetanus 2009.    Vitals reviewed and unremarkable. On exam he is resting comfortably.  GCS 15.  He is alert and responsive. Differential diagnosis/emergent conditions considered and evaluated for includes but not limited to abrasion, superficial laceration, hematoma, full-thickness laceration, dental fracture, [place] fracture.    On exam there is a 0.5 cm full-thickness laceration of the mid upper lip that stops just before the dry vermilion border. This is oozing just a very small amount of blood but bleeding is overall well-controlled.  No evidence of foreign bodies.  No dental fractures.  No pain of the face. He denies any loss of consciousness, headache, or vomiting. The branch really hit his lip and did not hit him in the head, I have low suspicion for intracranial hemorrhage, skull, or [place] fracture.  No midline spinal tenderness or step-off deformities.  He has full range of motion of his neck in all directions.  No upper extremity paresthesias. I do not think any imaging of his head or neck is  indicated.    Infraorbital nerve block was performed bilaterally given the laceration was right in the center of his upper lip with 6 mL of bupivacaine in total.  He had good response and I was able to perform repair of the upper lip.  An absorbable Vicryl suture was placed in the center of the laceration to help bring the interior aspect of the lip together. 3 non absorbable Prolene sutures were then placed on the front of the lip to close the front of the lip, with special attention paid to the vermilion border.  An additional absorbable Vicryl suture was placed on the posterior aspect of the lip.  Patient overall tolerated this well.  He will have the 3 nonabsorbable sutures cut out in 5 days.  He was educated on suture care and concerning symptoms that would warrant return to the emergency department. He is declining update of his tetanus vaccine.  He understands the risk associated with this.  This is a full-thickness lip laceration we will plan to start him on a short course of keflex for prophylactic coverage.       Supplemental history:  Obtained supplemental history:Supplemental history obtained?: Documented in chart and Family Member/Significant Other  Reviewed external records: External records reviewed?: No  Patient information was obtained from: patient  Use of Intrepreter: N/A     Complicating Factors:  Care impacted by chronic illness:Documented in Chart  Care significantly affected by social determinants of health:N/A    Work Up:  Did you consider but not order tests?: In addition to work-up documented, I considered the following work up: CT head and cervical spine  Did you interpret images independently?: Independent interpretation of ECG and images noted in documentation, when applicable.    External Consults:  Consultation discussion with other provider:Did you involve another provider (consultant, , pharmacy, etc.)?: No  Discharge. I prescribed additional prescription strength medication(s) as  charted. See documentation for any additional details.  I considered escalation of care and admitting the patient but ultimately did not given reassuring exam and workup.        ED COURSE:  2:15 PM I met and introduced myself to the patient. I gathered initial history and performed my physical exam. We discussed plan for initial workup.   3:45 PM I rechecked the patient and discussed results, discharge, follow up, and reasons to return to the ED.     At the conclusion of the encounter I discussed the results of all the tests and the disposition. The questions were answered. The patient or family acknowledged understanding and was agreeable with the care plan.        MEDICATIONS GIVEN IN THE EMERGENCY DEPARTMENT:  Medications   cephALEXin (KEFLEX) capsule 500 mg (500 mg Oral $Given 10/11/24 5236)         NEW PRESCRIPTIONS STARTED AT TODAY'S ED VISIT:  Discharge Medication List as of 10/11/2024  4:03 PM        START taking these medications    Details   cephALEXin (KEFLEX) 500 MG capsule Take 1 capsule (500 mg) by mouth 2 times daily for 5 days., Disp-10 capsule, R-0, Local Print               HPI       Paul is a 73 year old male with PMH of migraine headaches presenting to the emergency department for evaluation of a laceration to his upper lip that occurred about an hour and a half prior to arrival.  He states he was trimming a branch and it fell down and struck him in the mouth.  Denies any pain currently.  No meds prior to arrival.  He denies any loss of consciousness, headache, or vomiting.  He is not on any blood thinners.  Denies any loose teeth or pain in the mouth or face.  Last tetanus 2009.        Medical History     No past medical history on file.    Past Surgical History:   Procedure Laterality Date    CYSTOSCOPY W/ LITHOLAPAXY / EHL N/A 5/8/2019    Procedure: CYSTOLITHOLAPAXY WITH HOLMIUM LASER, BLADDER STONE EXTRACTION, WESTFALL PLACEMENT;  Surgeon: Moises Martini MD;  [place] [facility] [place]    INGUINAL HERNIA REPAIR Right 2019    Procedure: RIGHT INGUINAL HERNIA REPAIR;  Surgeon: Anil Andres MD;  [facility];  Service: General    KIDNEY STONE SURGERY      VASECTOMY  1982       Family History   Problem Relation Age of Onset    Cerebrovascular Disease Mother     Heart Disease Mother     Cerebrovascular Disease Father        Social History     Tobacco Use    Smoking status: Former     Current packs/day: 0.00     Types: Cigarettes     Quit date: 2003     Years since quittin.7    Smokeless tobacco: Never   Substance Use Topics    Alcohol use: No    Drug use: No       cephALEXin (KEFLEX) 500 MG capsule  traMADol (ULTRAM) 50 mg tablet          Physical Exam     First Vitals:  Patient Vitals for the past 24 hrs:   BP Temp Temp src Pulse Resp SpO2 Height Weight   10/11/24 1556 (!) 140/81 -- -- 68 -- 98 % -- --   10/11/24 1355 (!) 179/79 97.7  F (36.5  C) Temporal 76 18 100 % 1.829 m (6') 74.8 kg (165 lb)         PHYSICAL EXAM    General Appearance:  Alert, cooperative, no distress, appears stated age  HENT: Normocephalic without obvious deformity, atraumatic. Mucous membranes moist. On exam there is a 0.5 cm full-thickness laceration of the mid upper lip that stops just before the dry vermilion border. This is oozing just a very small amount of blood but bleeding is overall well-controlled.  No evidence of foreign bodies.  No dental fractures.  No pain of the face.   Eyes: Conjunctiva clear, Lids normal. No discharge.   Respiratory: No distress.   Cardiovascular: Regular rate   Musculoskeletal: Moving all extremities. No gross deformities  Integument: Warm, dry, no rashes or lesions  Neurologic: Alert and orientated x3. No focal deficits.  Psych: Normal mood and affect        Results     LAB:  All pertinent labs reviewed and interpreted  Labs Ordered and Resulted from Time of ED Arrival to Time of ED Departure - No data to display    RADIOLOGY:  No orders to  display         ECG:  N/A    PROCEDURES:  PROCEDURE: Laceration Repair   INDICATIONS: Laceration   PROCEDURE PROVIDER: Barbara Medina PA-C   SITE: lip   TYPE/SIZE: simple, complex, clean, and no foreign body visualized  0.5 cm (total length)   FUNCTIONAL ASSESSMENT: Distal sensation, circulation, motor, and tendon function intact   MEDICATION: 6 mLs of 0.25% Bupivacaine without epinephrine   PREPARATION: irrigation with Normal saline   DEBRIDEMENT: wound explored, no foreign body found   CLOSURE:  Superficial layer closed with 3 stitches of 5-0 Prolene simple interrupted  Deep layer closed with 2 stitches of 5-0 Vycril simple interrupted    Total number of sutures/staples placed: 5             Barbara Medina PA-C   Emergency Medicine   [facility] EMERGENCY ROOM       Barbara Medina PA-C  10/11/24 5992

## 2024-10-11 NOTE — DISCHARGE INSTRUCTIONS
You were seen in the ER today for a lip laceration.  This was cleaned out with saline and repaired in the emergency department.  A absorbable suture was placed between the layers of your lip to help close the laceration, this will dissolve on its own.  Nonabsorbable stitches were then placed along the front of your lip to bring the edges together, these will need to be cut out in 5 days. There is absorbable stitch on the backside of your lip by your teeth, this should dissolve on its own as well.    You declined tetanus vaccine update today  You were started on a short course of antibiotic called Keflex, take this twice a day for the next 5 days.    The numbing medication can last up to 6 hours but will likely start to wear off in the next couple of hours.  After that you will likely have quite a bit of soreness.  You can apply ice to the lip to help with swelling and pain.  You may take Ibuprofen up to 400 mg by mouth every 4-6 hours as needed for pain. Do not exceed 2400 mg/day.  You may take Tylenol 325-1000 mg by mouth every 4-6 hours as needed for pain. Do not exceed 1000mg (1g) in 4 hours or 4 g/day from all sources.  You may combine Tylenol and Ibuprofen- up to 400 mg of ibuprofen and 1000 mg of Tylenol at the same time, up to 3 times a day for the pain    Try to limit large facial expressions and anything that will stretch the stitches and cause any chance of popping them open.  I recommend smoothies and shakes for the next few days until the stitches are removed.

## 2024-10-15 RX ORDER — MOXIFLOXACIN 5 MG/ML
SOLUTION/ DROPS OPHTHALMIC
COMMUNITY
Start: 2024-05-26

## 2024-10-15 RX ORDER — TOBRAMYCIN AND DEXAMETHASONE 3; 1 MG/ML; MG/ML
SUSPENSION/ DROPS OPHTHALMIC
COMMUNITY
Start: 2024-05-28

## 2024-10-17 ENCOUNTER — OFFICE VISIT (OUTPATIENT)
Dept: FAMILY MEDICINE | Facility: CLINIC | Age: 74
End: 2024-10-17
Payer: COMMERCIAL

## 2024-10-17 VITALS
RESPIRATION RATE: 17 BRPM | HEART RATE: 60 BPM | BODY MASS INDEX: 22.21 KG/M2 | WEIGHT: 164 LBS | OXYGEN SATURATION: 96 % | TEMPERATURE: 98 F | DIASTOLIC BLOOD PRESSURE: 71 MMHG | HEIGHT: 72 IN | SYSTOLIC BLOOD PRESSURE: 116 MMHG

## 2024-10-17 DIAGNOSIS — S01.511D LIP LACERATION, SUBSEQUENT ENCOUNTER: Primary | ICD-10-CM

## 2024-10-17 DIAGNOSIS — Z48.02 VISIT FOR SUTURE REMOVAL: ICD-10-CM

## 2024-10-17 PROCEDURE — 99207 PR NO CHARGE NURSE ONLY: CPT | Performed by: NURSE PRACTITIONER

## 2024-10-17 ASSESSMENT — PAIN SCALES - GENERAL: PAINLEVEL: NO PAIN (0)

## 2024-10-17 NOTE — PROGRESS NOTES
Assessment and Plan:       ICD-10-CM    1. Lip laceration, subsequent encounter  S01.511D       2. Visit for suture removal  Z48.02         Lip laceration has healed well.  No signs of infection.  3 sutures were removed without difficulty.  Discussed signs of infection including redness, swelling, drainage, tenderness, fever.  If this occurs, he is to follow-up with his PCP.  He is content with the plan.    Subjective:     Paul is a 73 year old male presenting to the clinic for suture removal.  Patient was seen on 10/11/2024 in the ER for a lip laceration.  He was trimming a branch which fell down and struck him in the mouth.  He had a 0.5 cm full-thickness laceration of the mid upper lip.An absorbable Vicryl suture was placed in the center of the laceration to help bring the interior aspect of the lip together. 3 non absorbable Prolene sutures were then placed on the front of the lip to close the front of the lip, with special attention paid to the vermilion border.  An additional absorbable Vicryl suture was placed on the posterior aspect of the lip.  Patient was prescribed cephalexin.  Patient states this has healed well.  He denies pain.  He has not noticed any drainage from the laceration.  No fever has been present.    Reviewof Systems: A complete 14 point review of systems was obtained and is negative or as stated in the history of present illness.    Social History     Socioeconomic History    Marital status:      Spouse name: Not on file    Number of children: Not on file    Years of education: Not on file    Highest education level: Not on file   Occupational History    Not on file   Tobacco Use    Smoking status: Former     Current packs/day: 0.00     Types: Cigarettes     Quit date: 2003     Years since quittin.8     Passive exposure: Past    Smokeless tobacco: Never   Vaping Use    Vaping status: Never Used   Substance and Sexual Activity    Alcohol use: No    Drug use: No    Sexual  activity: Not on file   Other Topics Concern    Not on file   Social History Narrative    Not on file     Social Determinants of Health     Financial Resource Strain: Not on file   Food Insecurity: Not on file   Transportation Needs: Not on file   Physical Activity: Not on file   Stress: Not on file   Social Connections: Not on file   Interpersonal Safety: Low Risk  (10/17/2024)    Interpersonal Safety     Do you feel physically and emotionally safe where you currently live?: Yes     Within the past 12 months, have you been hit, slapped, kicked or otherwise physically hurt by someone?: No     Within the past 12 months, have you been humiliated or emotionally abused in other ways by your partner or ex-partner?: No   Housing Stability: Not on file       Active Ambulatory Problems     Diagnosis Date Noted    Tinea Pedis     Migraine Headache     Cervical Neuritis     Preoperative examination 05/03/2019    Bladder stone 05/03/2019    Hernia, inguinal, right 05/03/2019    Unilateral inguinal hernia without obstruction or gangrene, recurrence not specified      Resolved Ambulatory Problems     Diagnosis Date Noted    No Resolved Ambulatory Problems     No Additional Past Medical History       Family History   Problem Relation Age of Onset    Cerebrovascular Disease Mother     Heart Disease Mother     Cerebrovascular Disease Father        Objective:     /71   Pulse 60   Temp 98  F (36.7  C)   Resp 17   Ht 1.829 m (6')   Wt 74.4 kg (164 lb)   SpO2 96%   BMI 22.24 kg/m      Patient is alert, in no obvious distress.   Skin: Warm, dry.  He has a healing vertical laceration within his mid upper lip.  This is well approximated.  I removed 3 simple interrupted sutures without difficulty.  I was able to visualize the absorbable suture within his posterior lip.             Answers submitted by the patient for this visit:  General Questionnaire (Submitted on 10/17/2024)  Chief Complaint: Chronic problems general  questions HPI Form  How many days per week do you miss taking your medication?: 0  General Concern (Submitted on 10/17/2024)  Chief Complaint: Chronic problems general questions HPI Form  What is the reason for your visit today?: remove stitches in upper lip  When did your symptoms begin?: 3-7 days ago  What are your symptoms?: cut lip  How would you describe these symptoms?: Mild  Are your symptoms:: Staying the same  Have you had these symptoms before?: No

## 2025-02-12 ENCOUNTER — OFFICE VISIT (OUTPATIENT)
Dept: URGENT CARE | Facility: URGENT CARE | Age: 75
End: 2025-02-12
Payer: COMMERCIAL

## 2025-02-12 ENCOUNTER — NURSE TRIAGE (OUTPATIENT)
Dept: NURSING | Facility: CLINIC | Age: 75
End: 2025-02-12
Payer: COMMERCIAL

## 2025-02-12 VITALS
WEIGHT: 164 LBS | TEMPERATURE: 98.3 F | OXYGEN SATURATION: 99 % | HEART RATE: 75 BPM | DIASTOLIC BLOOD PRESSURE: 7 MMHG | BODY MASS INDEX: 22.24 KG/M2 | RESPIRATION RATE: 16 BRPM | SYSTOLIC BLOOD PRESSURE: 145 MMHG

## 2025-02-12 DIAGNOSIS — N30.01 ACUTE CYSTITIS WITH HEMATURIA: ICD-10-CM

## 2025-02-12 DIAGNOSIS — Z87.448 HISTORY OF BLADDER STONE: ICD-10-CM

## 2025-02-12 DIAGNOSIS — Z87.442 HISTORY OF KIDNEY STONES: ICD-10-CM

## 2025-02-12 DIAGNOSIS — R31.9 HEMATURIA, UNSPECIFIED TYPE: Primary | ICD-10-CM

## 2025-02-12 LAB
ALBUMIN UR-MCNC: >=300 MG/DL
APPEARANCE UR: CLEAR
BACTERIA #/AREA URNS HPF: ABNORMAL /HPF
BILIRUB UR QL STRIP: ABNORMAL
COLOR UR AUTO: ABNORMAL
GLUCOSE UR STRIP-MCNC: NEGATIVE MG/DL
HGB UR QL STRIP: ABNORMAL
KETONES UR STRIP-MCNC: NEGATIVE MG/DL
LEUKOCYTE ESTERASE UR QL STRIP: NEGATIVE
NITRATE UR QL: NEGATIVE
PH UR STRIP: 5.5 [PH] (ref 5–8)
RBC #/AREA URNS AUTO: >100 /HPF
SP GR UR STRIP: 1.02 (ref 1–1.03)
SQUAMOUS #/AREA URNS AUTO: ABNORMAL /LPF
UROBILINOGEN UR STRIP-ACNC: 0.2 E.U./DL
WBC #/AREA URNS AUTO: ABNORMAL /HPF

## 2025-02-12 PROCEDURE — 87086 URINE CULTURE/COLONY COUNT: CPT | Performed by: PHYSICIAN ASSISTANT

## 2025-02-12 PROCEDURE — 81001 URINALYSIS AUTO W/SCOPE: CPT | Performed by: PHYSICIAN ASSISTANT

## 2025-02-12 RX ORDER — SULFAMETHOXAZOLE AND TRIMETHOPRIM 800; 160 MG/1; MG/1
1 TABLET ORAL 2 TIMES DAILY
Qty: 14 TABLET | Refills: 0 | Status: SHIPPED | OUTPATIENT
Start: 2025-02-12 | End: 2025-02-19

## 2025-02-12 NOTE — TELEPHONE ENCOUNTER
Nurse Triage SBAR    Is this a 2nd Level Triage? YES, LICENSED PRACTITIONER REVIEW IS REQUIRED    Situation:   Patient reporting blood in urine    Background:   Patient reports this started Monday 2/10/25    Assessment:   Denies pain, reporting mild burning. Patient denies clots but notes it is a dark red color. Patient reports he is urinating more frequently (every 30 minutes)    Protocol Recommended Disposition:   See in Office Today    Recommendation:   Gave care advice, no office visits available. Patient agreeable to go to urgent care    Does the patient meet one of the following criteria for ADS visit consideration? 16+ years old, with an [facility] PCP     TIP  Providers, please consider if this condition is appropriate for management at one of our Acute and Diagnostic Services sites.     If patient is a good candidate, please use dotphrase <dot>triageresponse and select Refer to ADS to document.  Reason for Disposition   Pain or burning with passing urine (urination)    Additional Information   Negative: Shock suspected (e.g., cold/pale/clammy skin, too weak to stand, low BP, rapid pulse)   Negative: Sounds like a life-threatening emergency to the triager   Negative: Urinary catheter, questions about   Negative: Recent back or abdominal injury   Negative: Recent genital injury   Negative: Unable to urinate (or only a few drops) > 4 hours and bladder feels very full (e.g., palpable bladder or strong urge to urinate)   Negative: Diffuse (all over) muscle pains in the shoulders, arms, legs, and back and dark (cola or tea-colored) or red-colored urine   Negative: Passing pure blood or large blood clots (i.e., larger than a dime or grape)   Negative: Fever > 100.4 F (38.0 C)   Negative: Patient sounds very sick or weak to the triager   Negative: Known sickle cell disease   Negative: Taking Coumadin (warfarin) or other strong blood thinner, or known bleeding disorder (e.g., thrombocytopenia)   Negative: Side (flank) or  "back pain present    Answer Assessment - Initial Assessment Questions  1. COLOR of URINE: \"Describe the color of the urine.\"  (e.g., tea-colored, pink, red, bloody) \"Do you have blood clots in your urine?\" (e.g., none, pea, grape, small coin)      Red, Monday lighter red, each day it has gotten darker and thicker    2. ONSET: \"When did the bleeding start?\"       Started Monday 2/10/25    3. EPISODES: \"How many times has there been blood in the urine?\" or \"How many times today?\"      Every time, going to the bathroom every 30 minutes    4. PAIN with URINATION: \"Is there any pain with passing your urine?\" If Yes, ask: \"How bad is the pain?\"  (Scale 1-10; or mild, moderate, severe)     - MILD: Complains slightly about urination hurting.     - MODERATE: Interferes with normal activities.       - SEVERE: Excruciating, unwilling or unable to urinate because of the pain.       No pain    5. FEVER: \"Do you have a fever?\" If Yes, ask: \"What is your temperature, how was it measured, and when did it start?\"      No    6. ASSOCIATED SYMPTOMS: \"Are you passing urine more frequently than usual?\"      Frequency, mild burning sensation    7. OTHER SYMPTOMS: \"Do you have any other symptoms?\" (e.g., back/flank pain, abdomen pain, vomiting)      Denies other symptoms at this time    8. PREGNANCY: \"Is there any chance you are pregnant?\" \"When was your last menstrual period?\"      N/A    Protocols used: Urine - Blood In-A-OH    "

## 2025-02-12 NOTE — PATIENT INSTRUCTIONS
Go to the ER for moderate to severe abdomen pain, back pain, nausea/vomiting/fevers, not able to empty bladder.

## 2025-02-12 NOTE — PROGRESS NOTES
Assessment & Plan     MDM:  Pt was seen for 3 day hx of gross hematuria. He has very mild dysuria as well.  UA with some pyruia.  Urine culture pending.    Will cover with bactrim awaiting the results.  Discussed with pt that given his hx of bladder stone and kidney stones would be reasonable to follow-up with urology but especially if there is no growth on the urine culture.  Pt agreeable.  Pt has no abdomen pain or back pain. His dysuria is quite mild and only occurred after developing the hematuria.  Referral placed for urology. Will contact pt with results of urine culture if change of abx needed.  Will obtain a BMP for renal function today primarily given his history of ureteral stones and kidney stones and not having any recent medical care.  To ED for abdomen pain, fevers, back pain, persistent nausea/vomiting or can not void.  At the end of the encounter, I discussed results, diagnosis, medications. Discussed red flags for immediate return to clinic/ER, as well as indications for follow up if no improvement. Patient understood and agreed to plan. Patient was stable for discharge          Hematuria, unspecified type  - UA Macroscopic with reflex to Microscopic and Culture - Clinic Collect  - UA Microscopic with Reflex to Culture  - Urine Culture  - sulfamethoxazole-trimethoprim (BACTRIM DS) 800-160 MG tablet  Dispense: 14 tablet; Refill: 0  - Basic metabolic panel  (Ca, Cl, CO2, Creat, Gluc, K, Na, BUN)  - Adult Urology  Referral  - Basic metabolic panel  (Ca, Cl, CO2, Creat, Gluc, K, Na, BUN)    Acute cystitis with hematuria  - sulfamethoxazole-trimethoprim (BACTRIM DS) 800-160 MG tablet  Dispense: 14 tablet; Refill: 0  - Basic metabolic panel  (Ca, Cl, CO2, Creat, Gluc, K, Na, BUN)  - Basic metabolic panel  (Ca, Cl, CO2, Creat, Gluc, K, Na, BUN)    History of bladder stone    - Adult Urology  Referral    History of kidney stones    - Adult Urology  Referral           Viviana  CHATA Mendoza [facility] JEREMIAS Miller is a 74 year old male who presents to clinic today for the following health issues:  Chief Complaint   Patient presents with    UTI     Has had blood in urine for 3 days no back pain       HPI  Patient is a 74-year-old male presents to urgent care with concerns regarding gross hematuria x 3 days.  He denies any abdominal pain or back pain.  Urgency, frequency, nocturia x 6-8.    Slight burn started after having hematuria.  Hx of bladder and kidney stones.    No pcp, otherwise healthy , has not had medical care for several years.    Remote history of smoking tobacco , quit smoking 2003.    Patient denies any restrictive voiding symptoms.  He has no difficulty starting stream.  He denies any history of prostate problems.    Review of Systems  Constitutional, HEENT, cardiovascular, pulmonary, gi and gu systems are negative, except as otherwise noted.      Objective    BP (!) 145/7   Pulse 75   Temp 98.3  F (36.8  C) (Oral)   Resp 16   Wt 74.4 kg (164 lb)   SpO2 99%   BMI 22.24 kg/m    Physical Exam   Patient is in no acute distress and appears well.  No costovertebral angle tenderness is present.  Abdomen is soft nontender to light or deep palpation no masses or hepatosplenomegaly present.  Results for orders placed or performed in visit on 02/12/25   UA Macroscopic with reflex to Microscopic and Culture - Clinic Collect     Status: Abnormal    Specimen: Urine, Clean Catch   Result Value Ref Range    Color Urine Light Red (A) Colorless, Straw, Light Yellow, Yellow    Appearance Urine Clear Clear    Glucose Urine Negative Negative mg/dL    Bilirubin Urine Small (A) Negative    Ketones Urine Negative Negative mg/dL    Specific Gravity Urine 1.025 1.005 - 1.030    Blood Urine Large (A) Negative    pH Urine 5.5 5.0 - 8.0    Protein Albumin Urine >=300 (A) Negative mg/dL    Urobilinogen Urine 0.2 0.2, 1.0 E.U./dL    Nitrite Urine Negative  Negative    Leukocyte Esterase Urine Negative Negative   UA Microscopic with Reflex to Culture     Status: Abnormal   Result Value Ref Range    Bacteria Urine Few (A) None Seen /HPF    RBC Urine >100 (A) 0-2 /HPF /HPF    WBC Urine 10-25 (A) 0-5 /HPF /HPF    Squamous Epithelials Urine Few (A) None Seen /LPF

## 2025-02-13 LAB
ANION GAP SERPL CALCULATED.3IONS-SCNC: 8 MMOL/L (ref 7–15)
BACTERIA UR CULT: NO GROWTH
BUN SERPL-MCNC: 14.5 MG/DL (ref 8–23)
CALCIUM SERPL-MCNC: 11.1 MG/DL (ref 8.8–10.4)
CHLORIDE SERPL-SCNC: 102 MMOL/L (ref 98–107)
CREAT SERPL-MCNC: 0.88 MG/DL (ref 0.67–1.17)
EGFRCR SERPLBLD CKD-EPI 2021: >90 ML/MIN/1.73M2
GLUCOSE SERPL-MCNC: 77 MG/DL (ref 70–99)
HCO3 SERPL-SCNC: 26 MMOL/L (ref 22–29)
POTASSIUM SERPL-SCNC: 4 MMOL/L (ref 3.4–5.3)
SODIUM SERPL-SCNC: 136 MMOL/L (ref 135–145)

## 2025-02-15 ENCOUNTER — HOSPITAL ENCOUNTER (EMERGENCY)
Facility: CLINIC | Age: 75
Discharge: HOME | End: 2025-02-15
Attending: EMERGENCY MEDICINE | Admitting: EMERGENCY MEDICINE
Payer: COMMERCIAL

## 2025-02-15 ENCOUNTER — APPOINTMENT (OUTPATIENT)
Dept: CT IMAGING | Facility: CLINIC | Age: 75
End: 2025-02-15
Attending: EMERGENCY MEDICINE
Payer: COMMERCIAL

## 2025-02-15 VITALS
HEART RATE: 92 BPM | TEMPERATURE: 97.1 F | SYSTOLIC BLOOD PRESSURE: 141 MMHG | OXYGEN SATURATION: 100 % | RESPIRATION RATE: 19 BRPM | DIASTOLIC BLOOD PRESSURE: 82 MMHG

## 2025-02-15 DIAGNOSIS — N21.0 BLADDER STONES: ICD-10-CM

## 2025-02-15 LAB
ALBUMIN UR-MCNC: 100 MG/DL
ANION GAP SERPL CALCULATED.3IONS-SCNC: 11 MMOL/L (ref 7–15)
APPEARANCE UR: ABNORMAL
BASOPHILS # BLD AUTO: 0.1 10E3/UL (ref 0–0.2)
BASOPHILS NFR BLD AUTO: 1 %
BILIRUB UR QL STRIP: NEGATIVE
BUN SERPL-MCNC: 12.1 MG/DL (ref 8–23)
CALCIUM SERPL-MCNC: 10.6 MG/DL (ref 8.8–10.4)
CAOX CRY #/AREA URNS HPF: ABNORMAL /HPF
CHLORIDE SERPL-SCNC: 102 MMOL/L (ref 98–107)
COLOR UR AUTO: YELLOW
CREAT SERPL-MCNC: 1.16 MG/DL (ref 0.67–1.17)
EGFRCR SERPLBLD CKD-EPI 2021: 67 ML/MIN/1.73M2
EOSINOPHIL # BLD AUTO: 0.2 10E3/UL (ref 0–0.7)
EOSINOPHIL NFR BLD AUTO: 2 %
ERYTHROCYTE [DISTWIDTH] IN BLOOD BY AUTOMATED COUNT: 13.4 % (ref 10–15)
GLUCOSE SERPL-MCNC: 101 MG/DL (ref 70–99)
GLUCOSE UR STRIP-MCNC: NEGATIVE MG/DL
HCO3 SERPL-SCNC: 22 MMOL/L (ref 22–29)
HCT VFR BLD AUTO: 44 % (ref 40–53)
HGB BLD-MCNC: 16 G/DL (ref 13.3–17.7)
HGB UR QL STRIP: ABNORMAL
IMM GRANULOCYTES # BLD: 0 10E3/UL
IMM GRANULOCYTES NFR BLD: 0 %
KETONES UR STRIP-MCNC: 10 MG/DL
LEUKOCYTE ESTERASE UR QL STRIP: NEGATIVE
LYMPHOCYTES # BLD AUTO: 2.2 10E3/UL (ref 0.8–5.3)
LYMPHOCYTES NFR BLD AUTO: 28 %
MCH RBC QN AUTO: 31.7 PG (ref 26.5–33)
MCHC RBC AUTO-ENTMCNC: 36.4 G/DL (ref 31.5–36.5)
MCV RBC AUTO: 87 FL (ref 78–100)
MONOCYTES # BLD AUTO: 0.6 10E3/UL (ref 0–1.3)
MONOCYTES NFR BLD AUTO: 7 %
MUCOUS THREADS #/AREA URNS LPF: PRESENT /LPF
NEUTROPHILS # BLD AUTO: 4.9 10E3/UL (ref 1.6–8.3)
NEUTROPHILS NFR BLD AUTO: 62 %
NITRATE UR QL: NEGATIVE
NRBC # BLD AUTO: 0 10E3/UL
NRBC BLD AUTO-RTO: 0 /100
PH UR STRIP: 6.5 [PH] (ref 5–7)
PLATELET # BLD AUTO: 263 10E3/UL (ref 150–450)
POTASSIUM SERPL-SCNC: 4.1 MMOL/L (ref 3.4–5.3)
RBC # BLD AUTO: 5.04 10E6/UL (ref 4.4–5.9)
RBC URINE: >182 /HPF
SODIUM SERPL-SCNC: 135 MMOL/L (ref 135–145)
SP GR UR STRIP: 1.01 (ref 1–1.03)
UROBILINOGEN UR STRIP-MCNC: <2 MG/DL
WBC # BLD AUTO: 7.8 10E3/UL (ref 4–11)
WBC URINE: 67 /HPF

## 2025-02-15 PROCEDURE — 82565 ASSAY OF CREATININE: CPT | Performed by: EMERGENCY MEDICINE

## 2025-02-15 PROCEDURE — 36415 COLL VENOUS BLD VENIPUNCTURE: CPT | Performed by: EMERGENCY MEDICINE

## 2025-02-15 PROCEDURE — 99285 EMERGENCY DEPT VISIT HI MDM: CPT | Mod: 25 | Performed by: EMERGENCY MEDICINE

## 2025-02-15 PROCEDURE — 74178 CT ABD&PLV WO CNTR FLWD CNTR: CPT

## 2025-02-15 PROCEDURE — 87086 URINE CULTURE/COLONY COUNT: CPT | Performed by: EMERGENCY MEDICINE

## 2025-02-15 PROCEDURE — 250N000011 HC RX IP 250 OP 636: Performed by: EMERGENCY MEDICINE

## 2025-02-15 PROCEDURE — 85025 COMPLETE CBC W/AUTO DIFF WBC: CPT | Performed by: EMERGENCY MEDICINE

## 2025-02-15 PROCEDURE — 81001 URINALYSIS AUTO W/SCOPE: CPT | Performed by: EMERGENCY MEDICINE

## 2025-02-15 PROCEDURE — 51798 US URINE CAPACITY MEASURE: CPT | Performed by: EMERGENCY MEDICINE

## 2025-02-15 RX ORDER — IOPAMIDOL 755 MG/ML
90 INJECTION, SOLUTION INTRAVASCULAR ONCE
Status: COMPLETED | OUTPATIENT
Start: 2025-02-15 | End: 2025-02-15

## 2025-02-15 RX ADMIN — IOPAMIDOL 90 ML: 755 INJECTION, SOLUTION INTRAVENOUS at 09:39

## 2025-02-15 ASSESSMENT — ACTIVITIES OF DAILY LIVING (ADL)
ADLS_ACUITY_SCORE: 45

## 2025-02-15 NOTE — ED TRIAGE NOTES
Pt c/o urinary retention states unable to urinate since last night but  during triage process states he had to go immediately. Pt was bladder scanned at that time and 206, pt using bathroom and will bladder scan again, pt treated with UTI on Wednesday after seen at urgent care is on Abx fir this but did not yet take dose this am.  Denies fevers, denies pain unless trying to urinate/.   Post void bladder scan done 78ml

## 2025-02-15 NOTE — DISCHARGE INSTRUCTIONS
Have 2 large stones within the bladder.  They are 20 and 22 mm each, about size of a grape.  These will need surgery to remove.  I spoke with urology to try and help expedite outpatient follow-up and they should be calling you to schedule appointment but I would simply call clinic on Monday to schedule an appointment for follow-up to discuss operative management.  In the meantime, if you have some urinary retention sensation would recommend simply repositioning your self so temporarily, lying on side so that the stones removed from the opening to the urethra and you should be able to urinate.  With the stones, I would anticipate you still have some intermittent hematuria.  If you develop any pain with urination, fevers or chills please return to the ER for evaluation for urinary tract infection.

## 2025-02-16 LAB — BACTERIA UR CULT: NO GROWTH

## 2025-02-18 ENCOUNTER — VIRTUAL VISIT (OUTPATIENT)
Dept: UROLOGY | Facility: CLINIC | Age: 75
End: 2025-02-18
Payer: COMMERCIAL

## 2025-02-18 VITALS — WEIGHT: 160 LBS | BODY MASS INDEX: 21.67 KG/M2 | HEIGHT: 72 IN

## 2025-02-18 DIAGNOSIS — N21.0 BLADDER STONES: Primary | ICD-10-CM

## 2025-02-18 DIAGNOSIS — N40.0 BENIGN PROSTATIC HYPERPLASIA, UNSPECIFIED WHETHER LOWER URINARY TRACT SYMPTOMS PRESENT: ICD-10-CM

## 2025-02-18 DIAGNOSIS — N20.0 NEPHROLITHIASIS: ICD-10-CM

## 2025-02-18 PROCEDURE — 98001 SYNCH AUDIO-VIDEO NEW LOW 30: CPT | Performed by: NURSE PRACTITIONER

## 2025-02-18 ASSESSMENT — PAIN SCALES - GENERAL: PAINLEVEL_OUTOF10: SEVERE PAIN (8)

## 2025-02-18 NOTE — PATIENT INSTRUCTIONS
UROLOGY CLINIC VISIT PATIENT INSTRUCTIONS    If having severe flank pain, fevers, chills, nausea, or vomiting please notify Urology clinic or be seen in the ER.       If you have any issues, questions or concerns in the meantime, do not hesitate to contact us at [facility] at [phone] or via [employer].     Bre Elizondo CNP  Department of Urology

## 2025-02-18 NOTE — PROGRESS NOTES
Urology Video Office Visit    Video-Visit Details    Type of service:  Video Visit    Video Start Time: 1430    Video End Time: 1445    Originating Location (pt. Location): Home    Distant Location (provider location):  Off-site     Platform used for Video Visit: [employer]           Assessment and Plan:     Assessment: 74 year old male with two 2cm x 2cm bladder stones.     Plan:  -Reviewed CT scan with patient. Noted two large bladder stones.   -Discussed common cause of bladder stones can be related to obstruction d/t an enlarged prostate. He is open to an outlet procedure at the time or the cystolitholapaxy or in the future if necessary.   -Recommend cystolitholapaxy for stone removal, After discussing risks and benefits a cystolitholapaxy including but not limited to the following: bleeding, infection, and hematuria. Pt amenable to plan.   -If having severe flank pain, fevers, chills, nausea, or vomiting please notify Urology clinic or be seen in the ER.   -Will refer to Dr. Allen, Dr. Avilez, or Dr. Westbrook.     Bre Elizondo CNP  Department of Urology  February 18, 2025    I spent a total of 30 minutes spent on the date of the encounter doing chart review, history and exam, documentation, and further activities as noted above.          Chief Complaint:   Bladder stone          History of Present Illness:    Paul Esposito is a pleasant 74 year old male who presents with concerns of a bladder stones.     Mr. Esposito was in UC on 2/12/25 for concerns of hematuria. He notes intermittent hematuria has been present for several months.     He was seen in the ED on 2/15/25 for concerns of hematuria and difficulty urinating. CTU on 2/15/25 (images personally reviewed) revealed two 2cm x 2cm bladder stones. Noted nonobstructing left lower pole stones. No noted hydronephrosis or ureterolithiasis.     He notes urinary frequency and will void up to every 15 minutes during the day. He will void about every 2 hours  at night. Denies any f/c/n/v.     He notes increase in urinary frequency started about 1-2 weeks ago.     First stone was in 2006.     Family history of nephrolithiasis in father.     History of a cystolitholapaxy in 2019 with Dr. Martini.     History of BPH. He was recommend to start on tamsulosin in the past but didn't start due to concerns of orthostatic hypotension.     Denies any family history of prostate CA.     He is not on any anticoagulants.          Past Medical History:   No past medical history on file.         Past Surgical History:     Past Surgical History:   Procedure Laterality Date    CYSTOSCOPY W/ LITHOLAPAXY / [facility] N/A 5/8/2019    Procedure: CYSTOLITHOLAPAXY WITH HOLMIUM LASER, BLADDER STONE EXTRACTION, WESTFALL PLACEMENT;  Surgeon: Moises Martini MD;  Location: [facility];  Service: Urology    INGUINAL HERNIA REPAIR Right 5/21/2019    Procedure: RIGHT INGUINAL HERNIA REPAIR;  Surgeon: Anil Andres MD;  Location: [facility];  Service: General    KIDNEY STONE SURGERY      VASECTOMY  1982            Medications     Current Outpatient Medications   Medication Sig Dispense Refill    moxifloxacin (VIGAMOX) 0.5 % ophthalmic solution INSTILL 1 DROP EVERY 2 HOURS IN THE LEFT EYE UNTIL FOLLOW UP, THEN AS DIRECTED (Patient not taking: Reported on 2/12/2025)      sulfamethoxazole-trimethoprim (BACTRIM DS) 800-160 MG tablet Take 1 tablet by mouth 2 times daily for 7 days. 14 tablet 0    tobramycin-dexAMETHasone (TOBRADEX) 0.3-0.1 % ophthalmic suspension INSTILL ONE DROP TO THE LEFT EYE SIX TIMES A DAY FOR ONE WEEK (Patient not taking: Reported on 2/12/2025)      traMADol (ULTRAM) 50 mg tablet [TRAMADOL (ULTRAM) 50 MG TABLET] Take 1-2 tablets ( mg total) by mouth every 6 (six) hours as needed for pain. (Patient not taking: Reported on 2/12/2025) 20 tablet 0     No current facility-administered medications for this visit.            Family History:     Family History   Problem  Relation Age of Onset    Cerebrovascular Disease Mother     Heart Disease Mother     Cerebrovascular Disease Father             Social History:     Social History     Socioeconomic History    Marital status:      Spouse name: Not on file    Number of children: Not on file    Years of education: Not on file    Highest education level: Not on file   Occupational History    Not on file   Tobacco Use    Smoking status: Former     Current packs/day: 0.00     Types: Cigarettes     Quit date: 2003     Years since quittin.1     Passive exposure: Past    Smokeless tobacco: Never   Vaping Use    Vaping status: Never Used   Substance and Sexual Activity    Alcohol use: No    Drug use: No    Sexual activity: Not on file   Other Topics Concern    Not on file   Social History Narrative    Not on file     Social Drivers of Health     Financial Resource Strain: Not on file   Food Insecurity: Not on file   Transportation Needs: Not on file   Physical Activity: Not on file   Stress: Not on file   Social Connections: Not on file   Interpersonal Safety: Low Risk  (10/17/2024)    Interpersonal Safety     Do you feel physically and emotionally safe where you currently live?: Yes     Within the past 12 months, have you been hit, slapped, kicked or otherwise physically hurt by someone?: No     Within the past 12 months, have you been humiliated or emotionally abused in other ways by your partner or ex-partner?: No   Housing Stability: Not on file            Allergies:   Patient has no known allergies.         Review of Systems:  From intake questionnaire   Negative 14 system review except as noted on HPI, nurse's note.         Physical Exam:   General Appearance: Well groomed, hygenic  Eyes: No redness, discharge  Respiratory: No cough, no respiratory distress or labored breathing  Musculoskeletal: Grossly normal, full range of motion in upper extremities, no gross deficits  Skin: No discoloration or apparent  rashes  Neurologic - No tremors  Psychiatric - Alert and oriented  The rest of a comprehensive physical examination is deferred due to video visit restrictions        Labs:    I personally reviewed all applicable laboratory data and went over findings with patient  Significant for:    CBC RESULTS:  Recent Labs   Lab Test 02/15/25  0839 05/03/19  0858 03/25/19  0728   WBC 7.8  --  4.9   HGB 16.0 15.1 16.6     --  271        BMP RESULTS:  Recent Labs   Lab Test 02/15/25  0839 02/12/25  1616 03/25/19  0728    136 141   POTASSIUM 4.1 4.0 4.3   CHLORIDE 102 102 104   CO2 22 26 28   ANIONGAP 11 8 9   * 77 79   BUN 12.1 14.5 14   CR 1.16 0.88 0.90   GFRESTIMATED 67 >90 >60   GFRESTBLACK  --   --  >60   ELEN 10.6* 11.1* 10.9*       UA RESULTS:   Recent Labs   Lab Test 02/15/25  0832 02/12/25  1522 03/25/19  0729   SG 1.014 1.025 1.015   URINEPH 6.5 5.5 7.5   NITRITE Negative Negative Negative   RBCU >182* >100* 0-2   WBCU 67* 10-25* None Seen       CALCIUM RESULTS  Lab Results   Component Value Date    ELEN 10.6 02/15/2025    ELEN 11.1 02/12/2025    ELEN 10.9 03/25/2019           Imaging:    I personally reviewed all applicable imaging and went over the below findings with patient.    Results for orders placed or performed during the hospital encounter of 02/15/25   CT Urogram wo & w Contrast    Narrative    EXAM: CT UROGRAM WO and W CONTRAST  LOCATION: [facility]  DATE: 2/15/2025    INDICATION: gross hematuria hx of stones  COMPARISON: None.  TECHNIQUE: CT scan of the abdomen and pelvis using urogram technique with pre contrast, post contrast, and delayed images. Multiplanar reformats were obtained. Dose reduction techniques were used.   CONTRAST: 90ml Isovue 370    FINDINGS:     LOWER CHEST: Unremarkable.     HEPATOBILIARY: Subcentimeter hypoattenuating lesion/lesions are too small to characterize. No biliary dilation. Normal gallbladder.     PANCREAS: Normal.     SPLEEN:  Normal.     ADRENAL GLANDS: Normal.     KIDNEYS/BLADDER: Subcentimeter hypoattenuating lesion/lesions are too small to characterize. Nonobstructing left nephrolithiasis. There is mild wall thickening of the left renal pelvis. Two large stones are seen in the urinary bladder measuring 20 and 22   mm. Marked urinary bladder wall thickening. No suspicious upper tract filling defects on delayed phase imaging.     BOWEL: No obstruction. Normal appendix. Colonic diverticulosis. Mild wall thickening of the descending and rectosigmoid colon, possibly in part due to underdistention. No pneumoperitoneum or free fluid.     LYMPH NODES: No pathologically enlarged lymph nodes.    VASCULATURE: Nonaneurysmal aorta with mild aortoiliac calcifications.     PELVIC ORGANS: Prostatomegaly.     MUSCULOSKELETAL: Multilevel degenerative disc disease of the thoracolumbar spine.       Impression    IMPRESSION:  1.  Two large urinary bladder stones with marked urinary bladder wall thickening. Correlate with urinalysis.  2.  Nonobstructing left nephrolithiasis.  3.  No suspicious upper tract filling defects.  4.  Mild wall thickening of the descending and rectosigmoid colon, possibly in part due to underdistention. Correlate clinically for signs/symptoms of colitis.  5.  Prostatomegaly.

## 2025-02-18 NOTE — NURSING NOTE
Current patient location: [facility] [address] [facility] [address]    Is the patient currently in the state of [place]? YES    Visit mode: VIDEO    If the visit is dropped, the patient can be reconnected by:VIDEO VISIT: Send to e-mail at: [email or web address]    Will anyone else be joining the visit? NO  (If patient encounters technical issues they should call [phone])    Are changes needed to the allergy or medication list? No    Are refills needed on medications prescribed by this physician? NO    Rooming Documentation:  Unable to complete questionnaire(s) due to time    Reason for visit: Consult    Denita ALARCON

## 2025-02-20 ENCOUNTER — TELEPHONE (OUTPATIENT)
Dept: UROLOGY | Facility: CLINIC | Age: 75
End: 2025-02-20

## 2025-02-20 ENCOUNTER — VIRTUAL VISIT (OUTPATIENT)
Dept: UROLOGY | Facility: CLINIC | Age: 75
End: 2025-02-20
Payer: COMMERCIAL

## 2025-02-20 DIAGNOSIS — N40.1 BENIGN LOCALIZED PROSTATIC HYPERPLASIA WITH LOWER URINARY TRACT SYMPTOMS (LUTS): ICD-10-CM

## 2025-02-20 DIAGNOSIS — N21.0 BLADDER STONE: Primary | ICD-10-CM

## 2025-02-20 DIAGNOSIS — Z12.5 ENCOUNTER FOR SCREENING FOR MALIGNANT NEOPLASM OF PROSTATE: ICD-10-CM

## 2025-02-20 PROCEDURE — 98006 SYNCH AUDIO-VIDEO EST MOD 30: CPT | Performed by: STUDENT IN AN ORGANIZED HEALTH CARE EDUCATION/TRAINING PROGRAM

## 2025-02-20 NOTE — NURSING NOTE
Current patient location: [facility] [address]  [facility] [address]    Is the patient currently in the state of [place]? YES    Visit mode: VIDEO    If the visit is dropped, the patient can be reconnected by:VIDEO VISIT: Text to cell phone:   Telephone Information:   Mobile [phone]    and VIDEO VISIT: Send to e-mail at: [email or web address]    Will anyone else be joining the visit? NO  (If patient encounters technical issues they should call [phone])    Are changes needed to the allergy or medication list? Yes pt is not taking any medications     Are refills needed on medications prescribed by this physician? NO    Rooming Documentation:  Not applicable    Reason for visit: RECHECK (Discuss cystolithalopaxy)    Tamia Sameer VVF    Abdomen and low back pain - pt had those a few days ago but is no longer experiencing that.  When pt urinates, he is in pain- pain level 6-9, pt states that is varies.

## 2025-02-20 NOTE — PROGRESS NOTES
UROLOGY OUTPATIENT VISIT      Chief Complaint:   ***      Synopsis   Paul Esposito is a very pleasant AGE: 74 year old year old person with BPH and bladder stones    Mr. Esposito was in UC on 2/12/25 for concerns of hematuria. He notes intermittent hematuria has been present for several months.      He was seen in the ED on 2/15/25 for concerns of hematuria and difficulty urinating. CTU on 2/15/25 (images personally reviewed) revealed two 2cm x 2cm bladder stones. Noted nonobstructing left lower pole stones. No noted hydronephrosis or ureterolithiasis.     He notes urinary frequency and will void up to every 15 minutes during the day. He will void about every 2 hours at night. Denies any f/c/n/v.     First stone was in 2006.      Family history of nephrolithiasis in father.      History of a cystolitholapaxy in 2019 with Dr. Martini.      History of BPH. He was recommend to start on tamsulosin in the past but didn't start due to concerns of orthostatic hypotension.       Prostate size 4.6 x 4.9 x 4.3 = 50 gram prostate      Imaging   I personally reviewed the CT scan and by my interpretation it demonstrates 2 large 2 cm bladder stone  Prostate itself enlarged not extremely large more like 50 grams based on my measurements.    The following distinct labs were reviewed   Most Recent 3 CBC's:  Recent Labs   Lab Test 02/15/25  0839 05/03/19  0858 03/25/19  0728   WBC 7.8  --  4.9   HGB 16.0 15.1 16.6   MCV 87  --  97     --  271     Most Recent 3 BMP's:  Recent Labs   Lab Test 02/15/25  0839 02/12/25  1616 03/25/19  0728    136 141   POTASSIUM 4.1 4.0 4.3   CR 1.16 0.88 0.90   ELEN 10.6* 11.1* 10.9*   * 77 79     Most Recent 3 PSA:  Recent Labs   Lab Test 03/25/19  0728   PSA 1.3     Most Recent Urinalysis:  Recent Labs   Lab Test 02/15/25  0832 02/12/25  1522   COLOR Yellow Light Red*   APPEARANCE Turbid* Clear   URINEGLC Negative Negative   URINEBILI Negative Small*   URINEKETONE 10* Negative    SG 1.014 1.025   UBLD >1.0 mg/dL* Large*   URINEPH 6.5 5.5   PROTEIN 100* >=300*   UROBILINOGEN  --  0.2   NITRITE Negative Negative   LEUKEST Negative Negative   RBCU >182* >100*   WBCU 67* 10-25*       Medical Comorbidities    No past medical history on file.            Medications     Current Outpatient Medications   Medication Sig Dispense Refill    moxifloxacin (VIGAMOX) 0.5 % ophthalmic solution INSTILL 1 DROP EVERY 2 HOURS IN THE LEFT EYE UNTIL FOLLOW UP, THEN AS DIRECTED (Patient not taking: Reported on 2/12/2025)      tobramycin-dexAMETHasone (TOBRADEX) 0.3-0.1 % ophthalmic suspension INSTILL ONE DROP TO THE LEFT EYE SIX TIMES A DAY FOR ONE WEEK (Patient not taking: Reported on 2/12/2025)      traMADol (ULTRAM) 50 mg tablet [TRAMADOL (ULTRAM) 50 MG TABLET] Take 1-2 tablets ( mg total) by mouth every 6 (six) hours as needed for pain. (Patient not taking: Reported on 2/12/2025) 20 tablet 0     No current facility-administered medications for this visit.            Assessment/Plan   Paul Esposito is a very pleasant AGE: 74 year old year old person  He will work on pre-op    CC:  System, Provider Not In    Additional Coding Information:  Problems:  {mdm problems:638672}    Data Reviewed  {vgdatareviewed:607600}    Level of risk:  {vgmdmrisk:687786}    Time spent:  {2021 E&M time (Optional):989691}      Joined the call at invalid date.  Left the call at 2/20/2025, 12:47:59 pm.  You were on the call for .  17 min  Very bothersome urinary sx  Wants stones treated first then can do holep     invalid date.  Left the call at 2/20/2025, 12:47:59 pm.  You were on the call for .  17 min

## 2025-02-20 NOTE — TELEPHONE ENCOUNTER
Spoke with: Patient       Date of surgery: Thursday Feb 27 2025 with Dr Westbrook      Location: [facility] patient they will need a adult : YES      Pre op with provider: Patient is scheduled at [facility] Monday Feb 24 2025 840am       H&P Scheduled in PAC- NA         Pre procedure covid :Not req      Additional imaging: Na        Surgery Packet : Sent via [employer]       Additional comments: Please call for surgery teaching

## 2025-02-20 NOTE — PROGRESS NOTES
"Virtual Visit Details    Type of service:  Video Visit   Video Start Time: {video visit start/end time for provider to select:049018}  Video End Time:{video visit start/end time for provider to select:905391}    Originating Location (pt. Location): {video visit patient location:421461::\"Home\"}  {PROVIDER LOCATION On-site should be selected for visits conducted from your clinic location or adjoining [facility] hospital, academic office, or other nearby [facility] building. Off-site should be selected for all other provider locations, including home:923928}  Distant Location (provider location):  {virtual location provider:042795}  Platform used for Video Visit: {Virtual Visit Platforms:438666::\"i-marker\"}  "

## 2025-02-24 ENCOUNTER — OFFICE VISIT (OUTPATIENT)
Dept: FAMILY MEDICINE | Facility: CLINIC | Age: 75
End: 2025-02-24
Payer: COMMERCIAL

## 2025-02-24 VITALS
WEIGHT: 154.3 LBS | HEART RATE: 76 BPM | RESPIRATION RATE: 18 BRPM | BODY MASS INDEX: 20.9 KG/M2 | OXYGEN SATURATION: 99 % | TEMPERATURE: 97.7 F | HEIGHT: 72 IN | DIASTOLIC BLOOD PRESSURE: 74 MMHG | SYSTOLIC BLOOD PRESSURE: 117 MMHG

## 2025-02-24 DIAGNOSIS — Z12.11 SCREEN FOR COLON CANCER: ICD-10-CM

## 2025-02-24 DIAGNOSIS — N21.0 BLADDER STONE: ICD-10-CM

## 2025-02-24 DIAGNOSIS — Z01.818 PREOPERATIVE EXAMINATION: Primary | ICD-10-CM

## 2025-02-24 DIAGNOSIS — N40.1 BENIGN LOCALIZED PROSTATIC HYPERPLASIA WITH LOWER URINARY TRACT SYMPTOMS (LUTS): ICD-10-CM

## 2025-02-24 DIAGNOSIS — Z12.5 ENCOUNTER FOR SCREENING FOR MALIGNANT NEOPLASM OF PROSTATE: ICD-10-CM

## 2025-02-24 PROCEDURE — 99214 OFFICE O/P EST MOD 30 MIN: CPT | Performed by: NURSE PRACTITIONER

## 2025-02-24 PROCEDURE — G0103 PSA SCREENING: HCPCS | Performed by: NURSE PRACTITIONER

## 2025-02-24 PROCEDURE — 36415 COLL VENOUS BLD VENIPUNCTURE: CPT | Performed by: NURSE PRACTITIONER

## 2025-02-24 PROCEDURE — G2211 COMPLEX E/M VISIT ADD ON: HCPCS | Performed by: NURSE PRACTITIONER

## 2025-02-24 NOTE — H&P (VIEW-ONLY)
Preoperative Evaluation  [facility]  [address] [address]  Phone: [phone]  Fax: [phone]  Primary Provider: Provider Not In System  Pre-op Performing Provider: CELESTINO Joe CNP  Feb 24, 2025 2/21/2025   Surgical Information   What procedure is being done? Removal of two stones in the bladder.   Facility or Hospital where procedure/surgery will be performed: [facility]   Who is doing the procedure / surgery? Dr. Vargas Westbrook   Date of surgery / procedure: 02/27/2025   Time of surgery / procedure: 3:00 pm   Where do you plan to recover after surgery? at home with family     Fax number for surgical facility: Note does not need to be faxed, will be available electronically in Epic.    Assessment & Plan     The proposed surgical procedure is considered INTERMEDIATE risk.    Screen for colon cancer  **  - Colonoscopy Screening  Referral    Preoperative examination  **    Bladder stone  **    Benign localized prostatic hyperplasia with lower urinary tract symptoms (LUTS)  **  - PSA, screen    Encounter for screening for malignant neoplasm of prostate  **  - PSA, screen       Risks and Recommendations  The patient has the following additional risks and recommendations for perioperative complications:   - No identified additional risk factors other than previously addressed    Antiplatelet or Anticoagulation Medication Instructions   - We reviewed the medication list and the patient is not on an antiplatelet or anticoagulation medications.    Additional Medication Instructions  We reviewed the medication list and there are no chronic medications that need to be adjusted for this procedure.    Recommendation  Approval given to proceed with proposed procedure, without further diagnostic evaluation.    Giselle Miller is a 74 year old, presenting for the following:  Pre-Op Exam (Pre op at [facility] on 02/27/25 with  Dr. Westbrook)          2/24/2025     8:45 AM   Additional Questions   Roomed by Aubrey   Accompanied by Self     HPI related to upcoming procedure:   CYSTOLITHOLAPAXY with possible laser       2/21/2025   Pre-Op Questionnaire   Have you ever had a heart attack or stroke? No   Have you ever had surgery on your heart or blood vessels, such as a stent placement, a coronary artery bypass, or surgery on an artery in your head, neck, heart, or legs? No   Do you have chest pain with activity? No   Do you have a history of heart failure? No   Do you currently have a cold, bronchitis or symptoms of other infection? No   Do you have a cough, shortness of breath, or wheezing? No   Do you or anyone in your family have previous history of blood clots? No   Do you or does anyone in your family have a serious bleeding problem such as prolonged bleeding following surgeries or cuts? No   Have you ever had problems with anemia or been told to take iron pills? No   Have you had any abnormal blood loss such as black, tarry or bloody stools? No   Have you ever had a blood transfusion? (!) YES-when he was born.    Have you ever had a transfusion reaction? No   Are you willing to have a blood transfusion if it is medically needed before, during, or after your surgery? Yes   Have you or any of your relatives ever had problems with anesthesia? No   Do you have sleep apnea, excessive snoring or daytime drowsiness? No   Do you have any artifical heart valves or other implanted medical devices like a pacemaker, defibrillator, or continuous glucose monitor? No   Do you have artificial joints? No   Are you allergic to latex? No       Status of Chronic Conditions:  See problem list for active medical problems.  Problems all longstanding and stable, except as noted/documented.  See ROS for pertinent symptoms related to these conditions.    Patient Active Problem List    Diagnosis Date Noted    Unilateral inguinal hernia without obstruction or  gangrene, recurrence not specified      Priority: Medium    Preoperative examination 2019     Priority: Medium    Bladder stone 2019     Priority: Medium    Hernia, inguinal, right 2019     Priority: Medium    Migraine Headache      Priority: Medium     Created by Conversion  Replacement Utility updated for latest IMO load        Cervical Neuritis      Priority: Medium     Created by Conversion  Replacement Utility updated for latest IMO load        Tinea Pedis      Priority: Medium     Created by Conversion          No past medical history on file.  Past Surgical History:   Procedure Laterality Date    CYSTOSCOPY W/ LITHOLAPAXY / EHL N/A 2019    Procedure: CYSTOLITHOLAPAXY WITH HOLMIUM LASER, BLADDER STONE EXTRACTION, WESTFALL PLACEMENT;  Surgeon: Moises Martini MD;  Location: [facility];  Service: Urology    HERNIA REPAIR      INGUINAL HERNIA REPAIR Right 2019    Procedure: RIGHT INGUINAL HERNIA REPAIR;  Surgeon: Anil Andres MD;  Location: [facility];  Service: General    KIDNEY STONE SURGERY      VASECTOMY  1982     No current outpatient medications on file.       No Known Allergies     Social History     Tobacco Use    Smoking status: Former     Current packs/day: 0.00     Average packs/day: 0.5 packs/day for 10.0 years (5.0 ttl pk-yrs)     Types: Cigarettes     Quit date: 2003     Years since quittin.1     Passive exposure: Past    Smokeless tobacco: Never   Substance Use Topics    Alcohol use: No     Family History   Problem Relation Age of Onset    Cerebrovascular Disease Mother     Heart Disease Mother     Hypertension Mother     Cerebrovascular Disease Father     Hypertension Father      History   Drug Use No             Review of Systems  Constitutional, HEENT, cardiovascular, pulmonary, gi and gu systems are negative, except as otherwise noted.    Objective    /74 (BP Location: Left arm, Patient Position: Sitting, Cuff Size:  Adult Regular)   Pulse 76   Temp 97.7  F (36.5  C) (Oral)   Resp 18   Ht 1.829 m (6')   Wt 70 kg (154 lb 4.8 oz)   SpO2 99%   BMI 20.93 kg/m     Estimated body mass index is 20.93 kg/m  as calculated from the following:    Height as of this encounter: 1.829 m (6').    Weight as of this encounter: 70 kg (154 lb 4.8 oz).  Physical Exam  GENERAL: alert and no distress  EYES: Eyes grossly normal to inspection, PERRL and conjunctivae and sclerae normal  NECK: no adenopathy, no asymmetry, masses, or scars  RESP: lungs clear to auscultation - no rales, rhonchi or wheezes  CV: regular rate and rhythm, normal S1 S2, no S3 or S4, no murmur, click or rub, no peripheral edema  MS: no gross musculoskeletal defects noted, no edema  SKIN: no suspicious lesions or rashes  NEURO: Normal strength and tone, mentation intact and speech normal  PSYCH: mentation appears normal, affect normal/bright    Recent Labs   Lab Test 02/15/25  0839 02/12/25  1616   HGB 16.0  --      --     136   POTASSIUM 4.1 4.0   CR 1.16 0.88        Diagnostics  Labs pending at this time.  Results will be reviewed when available.   No EKG required, no history of coronary heart disease, significant arrhythmia, peripheral arterial disease or other structural heart disease.    Revised Cardiac Risk Index (RCRI)  The patient has the following serious cardiovascular risks for perioperative complications:   - No serious cardiac risks = 0 points            Signed Electronically by: CELESTINO Joe CNP  A copy of this evaluation report is provided to the requesting physician.

## 2025-02-24 NOTE — PATIENT INSTRUCTIONS
CHLORHEXIDINE GLUCONATE 4 % EX SOLN--- this is what you buy, we will see if we have some here at the clinic

## 2025-02-24 NOTE — PROGRESS NOTES
Preoperative Evaluation  [facility]  [address] [address]  Phone: [phone]  Fax: [phone]  Primary Provider: Provider Not In System  Pre-op Performing Provider: CELESTINO Joe CNP  Feb 24, 2025 2/21/2025   Surgical Information   What procedure is being done? Removal of two stones in the bladder.   Facility or Hospital where procedure/surgery will be performed: [facility]   Who is doing the procedure / surgery? Dr. Vargas Westbrook   Date of surgery / procedure: 02/27/2025   Time of surgery / procedure: 3:00 pm   Where do you plan to recover after surgery? at home with family     Fax number for surgical facility: Note does not need to be faxed, will be available electronically in Epic.    Assessment & Plan     The proposed surgical procedure is considered INTERMEDIATE risk.    Screen for colon cancer  **  - Colonoscopy Screening  Referral    Preoperative examination  **    Bladder stone  **    Benign localized prostatic hyperplasia with lower urinary tract symptoms (LUTS)  **  - PSA, screen    Encounter for screening for malignant neoplasm of prostate  **  - PSA, screen       Risks and Recommendations  The patient has the following additional risks and recommendations for perioperative complications:   - No identified additional risk factors other than previously addressed    Antiplatelet or Anticoagulation Medication Instructions   - We reviewed the medication list and the patient is not on an antiplatelet or anticoagulation medications.    Additional Medication Instructions  We reviewed the medication list and there are no chronic medications that need to be adjusted for this procedure.    Recommendation  Approval given to proceed with proposed procedure, without further diagnostic evaluation.    Giselle Miller is a 74 year old, presenting for the following:  Pre-Op Exam (Pre op at [facility] on 02/27/25 with  Dr. Westbrook)          2/24/2025     8:45 AM   Additional Questions   Roomed by Aubrey   Accompanied by Self     HPI related to upcoming procedure:   CYSTOLITHOLAPAXY with possible laser       2/21/2025   Pre-Op Questionnaire   Have you ever had a heart attack or stroke? No   Have you ever had surgery on your heart or blood vessels, such as a stent placement, a coronary artery bypass, or surgery on an artery in your head, neck, heart, or legs? No   Do you have chest pain with activity? No   Do you have a history of heart failure? No   Do you currently have a cold, bronchitis or symptoms of other infection? No   Do you have a cough, shortness of breath, or wheezing? No   Do you or anyone in your family have previous history of blood clots? No   Do you or does anyone in your family have a serious bleeding problem such as prolonged bleeding following surgeries or cuts? No   Have you ever had problems with anemia or been told to take iron pills? No   Have you had any abnormal blood loss such as black, tarry or bloody stools? No   Have you ever had a blood transfusion? (!) YES-when he was born.    Have you ever had a transfusion reaction? No   Are you willing to have a blood transfusion if it is medically needed before, during, or after your surgery? Yes   Have you or any of your relatives ever had problems with anesthesia? No   Do you have sleep apnea, excessive snoring or daytime drowsiness? No   Do you have any artifical heart valves or other implanted medical devices like a pacemaker, defibrillator, or continuous glucose monitor? No   Do you have artificial joints? No   Are you allergic to latex? No       Status of Chronic Conditions:  See problem list for active medical problems.  Problems all longstanding and stable, except as noted/documented.  See ROS for pertinent symptoms related to these conditions.    Patient Active Problem List    Diagnosis Date Noted    Unilateral inguinal hernia without obstruction or  gangrene, recurrence not specified      Priority: Medium    Preoperative examination 2019     Priority: Medium    Bladder stone 2019     Priority: Medium    Hernia, inguinal, right 2019     Priority: Medium    Migraine Headache      Priority: Medium     Created by Conversion  Replacement Utility updated for latest IMO load        Cervical Neuritis      Priority: Medium     Created by Conversion  Replacement Utility updated for latest IMO load        Tinea Pedis      Priority: Medium     Created by Conversion          No past medical history on file.  Past Surgical History:   Procedure Laterality Date    CYSTOSCOPY W/ LITHOLAPAXY / EHL N/A 2019    Procedure: CYSTOLITHOLAPAXY WITH HOLMIUM LASER, BLADDER STONE EXTRACTION, WESTFALL PLACEMENT;  Surgeon: Moises Martini MD;  Location: [facility];  Service: Urology    HERNIA REPAIR      INGUINAL HERNIA REPAIR Right 2019    Procedure: RIGHT INGUINAL HERNIA REPAIR;  Surgeon: Anil Andres MD;  Location: [facility];  Service: General    KIDNEY STONE SURGERY      VASECTOMY  1982     No current outpatient medications on file.       No Known Allergies     Social History     Tobacco Use    Smoking status: Former     Current packs/day: 0.00     Average packs/day: 0.5 packs/day for 10.0 years (5.0 ttl pk-yrs)     Types: Cigarettes     Quit date: 2003     Years since quittin.1     Passive exposure: Past    Smokeless tobacco: Never   Substance Use Topics    Alcohol use: No     Family History   Problem Relation Age of Onset    Cerebrovascular Disease Mother     Heart Disease Mother     Hypertension Mother     Cerebrovascular Disease Father     Hypertension Father      History   Drug Use No             Review of Systems  Constitutional, HEENT, cardiovascular, pulmonary, gi and gu systems are negative, except as otherwise noted.    Objective    /74 (BP Location: Left arm, Patient Position: Sitting, Cuff Size:  Adult Regular)   Pulse 76   Temp 97.7  F (36.5  C) (Oral)   Resp 18   Ht 1.829 m (6')   Wt 70 kg (154 lb 4.8 oz)   SpO2 99%   BMI 20.93 kg/m     Estimated body mass index is 20.93 kg/m  as calculated from the following:    Height as of this encounter: 1.829 m (6').    Weight as of this encounter: 70 kg (154 lb 4.8 oz).  Physical Exam  GENERAL: alert and no distress  EYES: Eyes grossly normal to inspection, PERRL and conjunctivae and sclerae normal  NECK: no adenopathy, no asymmetry, masses, or scars  RESP: lungs clear to auscultation - no rales, rhonchi or wheezes  CV: regular rate and rhythm, normal S1 S2, no S3 or S4, no murmur, click or rub, no peripheral edema  MS: no gross musculoskeletal defects noted, no edema  SKIN: no suspicious lesions or rashes  NEURO: Normal strength and tone, mentation intact and speech normal  PSYCH: mentation appears normal, affect normal/bright    Recent Labs   Lab Test 02/15/25  0839 02/12/25  1616   HGB 16.0  --      --     136   POTASSIUM 4.1 4.0   CR 1.16 0.88        Diagnostics  Labs pending at this time.  Results will be reviewed when available.   No EKG required, no history of coronary heart disease, significant arrhythmia, peripheral arterial disease or other structural heart disease.    Revised Cardiac Risk Index (RCRI)  The patient has the following serious cardiovascular risks for perioperative complications:   - No serious cardiac risks = 0 points            Signed Electronically by: CELESTINO Joe CNP  A copy of this evaluation report is provided to the requesting physician.

## 2025-02-25 ENCOUNTER — TELEPHONE (OUTPATIENT)
Dept: UROLOGY | Facility: CLINIC | Age: 75
End: 2025-02-25
Payer: COMMERCIAL

## 2025-02-25 LAB — PSA SERPL DL<=0.01 NG/ML-MCNC: 1.46 NG/ML (ref 0–6.5)

## 2025-02-27 ENCOUNTER — ANESTHESIA (OUTPATIENT)
Dept: SURGERY | Facility: HOSPITAL | Age: 75
End: 2025-02-27
Payer: COMMERCIAL

## 2025-02-27 ENCOUNTER — TELEPHONE (OUTPATIENT)
Dept: SURGERY | Facility: CLINIC | Age: 75
End: 2025-02-27

## 2025-02-27 ENCOUNTER — ANESTHESIA EVENT (OUTPATIENT)
Dept: SURGERY | Facility: HOSPITAL | Age: 75
End: 2025-02-27
Payer: COMMERCIAL

## 2025-02-27 ENCOUNTER — HOSPITAL ENCOUNTER (OUTPATIENT)
Facility: HOSPITAL | Age: 75
Discharge: HOME | End: 2025-02-27
Attending: STUDENT IN AN ORGANIZED HEALTH CARE EDUCATION/TRAINING PROGRAM | Admitting: STUDENT IN AN ORGANIZED HEALTH CARE EDUCATION/TRAINING PROGRAM
Payer: COMMERCIAL

## 2025-02-27 VITALS
HEART RATE: 81 BPM | DIASTOLIC BLOOD PRESSURE: 64 MMHG | TEMPERATURE: 97.9 F | OXYGEN SATURATION: 98 % | RESPIRATION RATE: 16 BRPM | SYSTOLIC BLOOD PRESSURE: 133 MMHG

## 2025-02-27 DIAGNOSIS — E83.52 HYPERCALCEMIA: Primary | ICD-10-CM

## 2025-02-27 DIAGNOSIS — N21.0 BLADDER STONE: Primary | ICD-10-CM

## 2025-02-27 LAB
CA-I BLD-MCNC: 5.4 MG/DL (ref 4.4–5.2)
HOLD SPECIMEN: NORMAL
PTH-INTACT SERPL-MCNC: 87 PG/ML (ref 15–65)
VIT D+METAB SERPL-MCNC: 25 NG/ML (ref 20–50)

## 2025-02-27 PROCEDURE — 258N000003 HC RX IP 258 OP 636: Performed by: PAIN MEDICINE

## 2025-02-27 PROCEDURE — 272N000002 HC OR SUPPLY OTHER OPNP: Performed by: STUDENT IN AN ORGANIZED HEALTH CARE EDUCATION/TRAINING PROGRAM

## 2025-02-27 PROCEDURE — 82306 VITAMIN D 25 HYDROXY: CPT | Performed by: STUDENT IN AN ORGANIZED HEALTH CARE EDUCATION/TRAINING PROGRAM

## 2025-02-27 PROCEDURE — 250N000009 HC RX 250: Performed by: STUDENT IN AN ORGANIZED HEALTH CARE EDUCATION/TRAINING PROGRAM

## 2025-02-27 PROCEDURE — 250N000011 HC RX IP 250 OP 636: Performed by: STUDENT IN AN ORGANIZED HEALTH CARE EDUCATION/TRAINING PROGRAM

## 2025-02-27 PROCEDURE — 272N000001 HC OR GENERAL SUPPLY STERILE: Performed by: STUDENT IN AN ORGANIZED HEALTH CARE EDUCATION/TRAINING PROGRAM

## 2025-02-27 PROCEDURE — 258N000001 HC RX 258: Performed by: STUDENT IN AN ORGANIZED HEALTH CARE EDUCATION/TRAINING PROGRAM

## 2025-02-27 PROCEDURE — 250N000009 HC RX 250: Performed by: NURSE ANESTHETIST, CERTIFIED REGISTERED

## 2025-02-27 PROCEDURE — 258N000003 HC RX IP 258 OP 636: Performed by: NURSE ANESTHETIST, CERTIFIED REGISTERED

## 2025-02-27 PROCEDURE — 360N000077 HC SURGERY LEVEL 4, PER MIN: Performed by: STUDENT IN AN ORGANIZED HEALTH CARE EDUCATION/TRAINING PROGRAM

## 2025-02-27 PROCEDURE — 250N000011 HC RX IP 250 OP 636: Performed by: NURSE ANESTHETIST, CERTIFIED REGISTERED

## 2025-02-27 PROCEDURE — 83970 ASSAY OF PARATHORMONE: CPT | Performed by: STUDENT IN AN ORGANIZED HEALTH CARE EDUCATION/TRAINING PROGRAM

## 2025-02-27 PROCEDURE — 82330 ASSAY OF CALCIUM: CPT | Performed by: STUDENT IN AN ORGANIZED HEALTH CARE EDUCATION/TRAINING PROGRAM

## 2025-02-27 PROCEDURE — 36415 COLL VENOUS BLD VENIPUNCTURE: CPT | Performed by: STUDENT IN AN ORGANIZED HEALTH CARE EDUCATION/TRAINING PROGRAM

## 2025-02-27 PROCEDURE — 52318 REMOVE BLADDER STONE: CPT | Performed by: STUDENT IN AN ORGANIZED HEALTH CARE EDUCATION/TRAINING PROGRAM

## 2025-02-27 PROCEDURE — 82365 CALCULUS SPECTROSCOPY: CPT | Performed by: STUDENT IN AN ORGANIZED HEALTH CARE EDUCATION/TRAINING PROGRAM

## 2025-02-27 PROCEDURE — 250N000025 HC SEVOFLURANE, PER MIN: Performed by: STUDENT IN AN ORGANIZED HEALTH CARE EDUCATION/TRAINING PROGRAM

## 2025-02-27 PROCEDURE — 999N000141 HC STATISTIC PRE-PROCEDURE NURSING ASSESSMENT: Performed by: STUDENT IN AN ORGANIZED HEALTH CARE EDUCATION/TRAINING PROGRAM

## 2025-02-27 PROCEDURE — 710N000009 HC RECOVERY PHASE 1, LEVEL 1, PER MIN: Performed by: STUDENT IN AN ORGANIZED HEALTH CARE EDUCATION/TRAINING PROGRAM

## 2025-02-27 PROCEDURE — 250N000013 HC RX MED GY IP 250 OP 250 PS 637: Performed by: STUDENT IN AN ORGANIZED HEALTH CARE EDUCATION/TRAINING PROGRAM

## 2025-02-27 PROCEDURE — 370N000017 HC ANESTHESIA TECHNICAL FEE, PER MIN: Performed by: STUDENT IN AN ORGANIZED HEALTH CARE EDUCATION/TRAINING PROGRAM

## 2025-02-27 PROCEDURE — 710N000012 HC RECOVERY PHASE 2, PER MINUTE: Performed by: STUDENT IN AN ORGANIZED HEALTH CARE EDUCATION/TRAINING PROGRAM

## 2025-02-27 RX ORDER — FENTANYL CITRATE 50 UG/ML
50 INJECTION, SOLUTION INTRAMUSCULAR; INTRAVENOUS EVERY 5 MIN PRN
Status: DISCONTINUED | OUTPATIENT
Start: 2025-02-27 | End: 2025-02-27 | Stop reason: HOSPADM

## 2025-02-27 RX ORDER — NITROFURANTOIN 25; 75 MG/1; MG/1
100 CAPSULE ORAL 2 TIMES DAILY
Qty: 10 CAPSULE | Refills: 0 | Status: SHIPPED | OUTPATIENT
Start: 2025-02-27 | End: 2025-03-04

## 2025-02-27 RX ORDER — FENTANYL CITRATE 50 UG/ML
25 INJECTION, SOLUTION INTRAMUSCULAR; INTRAVENOUS EVERY 5 MIN PRN
Status: DISCONTINUED | OUTPATIENT
Start: 2025-02-27 | End: 2025-02-27 | Stop reason: HOSPADM

## 2025-02-27 RX ORDER — PROPOFOL 10 MG/ML
INJECTION, EMULSION INTRAVENOUS PRN
Status: DISCONTINUED | OUTPATIENT
Start: 2025-02-27 | End: 2025-02-27

## 2025-02-27 RX ORDER — ONDANSETRON 2 MG/ML
4 INJECTION INTRAMUSCULAR; INTRAVENOUS EVERY 30 MIN PRN
Status: DISCONTINUED | OUTPATIENT
Start: 2025-02-27 | End: 2025-02-27 | Stop reason: HOSPADM

## 2025-02-27 RX ORDER — SODIUM CHLORIDE, SODIUM LACTATE, POTASSIUM CHLORIDE, CALCIUM CHLORIDE 600; 310; 30; 20 MG/100ML; MG/100ML; MG/100ML; MG/100ML
INJECTION, SOLUTION INTRAVENOUS CONTINUOUS
Status: DISCONTINUED | OUTPATIENT
Start: 2025-02-27 | End: 2025-02-27 | Stop reason: HOSPADM

## 2025-02-27 RX ORDER — FUROSEMIDE 10 MG/ML
INJECTION INTRAMUSCULAR; INTRAVENOUS PRN
Status: DISCONTINUED | OUTPATIENT
Start: 2025-02-27 | End: 2025-02-27

## 2025-02-27 RX ORDER — DEXAMETHASONE SODIUM PHOSPHATE 10 MG/ML
INJECTION, SOLUTION INTRAMUSCULAR; INTRAVENOUS PRN
Status: DISCONTINUED | OUTPATIENT
Start: 2025-02-27 | End: 2025-02-27

## 2025-02-27 RX ORDER — NALOXONE HYDROCHLORIDE 1 MG/ML
0.1 INJECTION INTRAMUSCULAR; INTRAVENOUS; SUBCUTANEOUS
Status: DISCONTINUED | OUTPATIENT
Start: 2025-02-27 | End: 2025-02-27 | Stop reason: HOSPADM

## 2025-02-27 RX ORDER — OXYCODONE HYDROCHLORIDE 5 MG/1
5 TABLET ORAL
Status: CANCELLED | OUTPATIENT
Start: 2025-02-27

## 2025-02-27 RX ORDER — AMOXICILLIN 250 MG
1 CAPSULE ORAL DAILY
Qty: 30 TABLET | Refills: 0 | Status: SHIPPED | OUTPATIENT
Start: 2025-02-27

## 2025-02-27 RX ORDER — ONDANSETRON 2 MG/ML
INJECTION INTRAMUSCULAR; INTRAVENOUS PRN
Status: DISCONTINUED | OUTPATIENT
Start: 2025-02-27 | End: 2025-02-27

## 2025-02-27 RX ORDER — ACETAMINOPHEN 325 MG/1
975 TABLET ORAL ONCE
Status: COMPLETED | OUTPATIENT
Start: 2025-02-27 | End: 2025-02-27

## 2025-02-27 RX ORDER — LIDOCAINE 40 MG/G
CREAM TOPICAL
Status: DISCONTINUED | OUTPATIENT
Start: 2025-02-27 | End: 2025-02-27 | Stop reason: HOSPADM

## 2025-02-27 RX ORDER — ACETAMINOPHEN 650 MG/1
650 SUPPOSITORY RECTAL ONCE
Status: COMPLETED | OUTPATIENT
Start: 2025-02-27 | End: 2025-02-27

## 2025-02-27 RX ORDER — OXYCODONE HYDROCHLORIDE 5 MG/1
10 TABLET ORAL
Status: CANCELLED | OUTPATIENT
Start: 2025-02-27

## 2025-02-27 RX ORDER — DEXAMETHASONE SODIUM PHOSPHATE 4 MG/ML
4 INJECTION, SOLUTION INTRA-ARTICULAR; INTRALESIONAL; INTRAMUSCULAR; INTRAVENOUS; SOFT TISSUE
Status: DISCONTINUED | OUTPATIENT
Start: 2025-02-27 | End: 2025-02-27 | Stop reason: HOSPADM

## 2025-02-27 RX ORDER — CEFAZOLIN SODIUM/WATER 2 G/20 ML
2 SYRINGE (ML) INTRAVENOUS SEE ADMIN INSTRUCTIONS
Status: DISCONTINUED | OUTPATIENT
Start: 2025-02-27 | End: 2025-02-27 | Stop reason: HOSPADM

## 2025-02-27 RX ORDER — PHENAZOPYRIDINE HYDROCHLORIDE 200 MG/1
200 TABLET, FILM COATED ORAL 3 TIMES DAILY PRN
Qty: 9 TABLET | Refills: 0 | Status: SHIPPED | OUTPATIENT
Start: 2025-02-27

## 2025-02-27 RX ORDER — ONDANSETRON 2 MG/ML
4 INJECTION INTRAMUSCULAR; INTRAVENOUS EVERY 30 MIN PRN
Status: CANCELLED | OUTPATIENT
Start: 2025-02-27

## 2025-02-27 RX ORDER — ONDANSETRON 4 MG/1
4 TABLET, ORALLY DISINTEGRATING ORAL EVERY 30 MIN PRN
Status: CANCELLED | OUTPATIENT
Start: 2025-02-27

## 2025-02-27 RX ORDER — NALOXONE HYDROCHLORIDE 0.4 MG/ML
0.1 INJECTION, SOLUTION INTRAMUSCULAR; INTRAVENOUS; SUBCUTANEOUS
Status: CANCELLED | OUTPATIENT
Start: 2025-02-27

## 2025-02-27 RX ORDER — LIDOCAINE HYDROCHLORIDE 10 MG/ML
INJECTION, SOLUTION INFILTRATION; PERINEURAL PRN
Status: DISCONTINUED | OUTPATIENT
Start: 2025-02-27 | End: 2025-02-27

## 2025-02-27 RX ORDER — MAGNESIUM HYDROXIDE 1200 MG/15ML
LIQUID ORAL PRN
Status: DISCONTINUED | OUTPATIENT
Start: 2025-02-27 | End: 2025-02-27 | Stop reason: HOSPADM

## 2025-02-27 RX ORDER — SENNOSIDES 8.6 MG
650 CAPSULE ORAL EVERY 8 HOURS PRN
COMMUNITY
Start: 2025-02-27

## 2025-02-27 RX ORDER — CEFAZOLIN SODIUM/WATER 2 G/20 ML
2 SYRINGE (ML) INTRAVENOUS
Status: COMPLETED | OUTPATIENT
Start: 2025-02-27 | End: 2025-02-27

## 2025-02-27 RX ORDER — DEXAMETHASONE SODIUM PHOSPHATE 10 MG/ML
4 INJECTION, SOLUTION INTRAMUSCULAR; INTRAVENOUS
Status: CANCELLED | OUTPATIENT
Start: 2025-02-27

## 2025-02-27 RX ORDER — ONDANSETRON 4 MG/1
4 TABLET, ORALLY DISINTEGRATING ORAL EVERY 30 MIN PRN
Status: DISCONTINUED | OUTPATIENT
Start: 2025-02-27 | End: 2025-02-27 | Stop reason: HOSPADM

## 2025-02-27 RX ORDER — FENTANYL CITRATE 50 UG/ML
INJECTION, SOLUTION INTRAMUSCULAR; INTRAVENOUS PRN
Status: DISCONTINUED | OUTPATIENT
Start: 2025-02-27 | End: 2025-02-27

## 2025-02-27 RX ADMIN — PHENYLEPHRINE HYDROCHLORIDE 100 MCG: 10 INJECTION INTRAVENOUS at 15:28

## 2025-02-27 RX ADMIN — PHENYLEPHRINE HYDROCHLORIDE 100 MCG: 10 INJECTION INTRAVENOUS at 15:24

## 2025-02-27 RX ADMIN — PHENYLEPHRINE HYDROCHLORIDE 100 MCG: 10 INJECTION INTRAVENOUS at 15:30

## 2025-02-27 RX ADMIN — HYDROMORPHONE HYDROCHLORIDE 0.5 MG: 1 INJECTION, SOLUTION INTRAMUSCULAR; INTRAVENOUS; SUBCUTANEOUS at 15:57

## 2025-02-27 RX ADMIN — FENTANYL CITRATE 100 MCG: 50 INJECTION, SOLUTION INTRAMUSCULAR; INTRAVENOUS at 15:11

## 2025-02-27 RX ADMIN — DEXAMETHASONE SODIUM PHOSPHATE 5 MG: 10 INJECTION, SOLUTION INTRAMUSCULAR; INTRAVENOUS at 15:21

## 2025-02-27 RX ADMIN — DEXMEDETOMIDINE HYDROCHLORIDE 12 MCG: 100 INJECTION, SOLUTION INTRAVENOUS at 15:43

## 2025-02-27 RX ADMIN — ONDANSETRON 4 MG: 2 INJECTION INTRAMUSCULAR; INTRAVENOUS at 15:21

## 2025-02-27 RX ADMIN — SODIUM CHLORIDE, SODIUM LACTATE, POTASSIUM CHLORIDE, AND CALCIUM CHLORIDE: .6; .31; .03; .02 INJECTION, SOLUTION INTRAVENOUS at 14:54

## 2025-02-27 RX ADMIN — PHENYLEPHRINE HYDROCHLORIDE 200 MCG: 10 INJECTION INTRAVENOUS at 15:22

## 2025-02-27 RX ADMIN — LIDOCAINE HYDROCHLORIDE 5 ML: 10 INJECTION, SOLUTION INFILTRATION; PERINEURAL at 15:11

## 2025-02-27 RX ADMIN — PROPOFOL 200 MG: 10 INJECTION, EMULSION INTRAVENOUS at 15:11

## 2025-02-27 RX ADMIN — FUROSEMIDE 10 MG: 10 INJECTION, SOLUTION INTRAVENOUS at 16:37

## 2025-02-27 RX ADMIN — Medication 2 G: at 15:05

## 2025-02-27 RX ADMIN — HYDROMORPHONE HYDROCHLORIDE 0.5 MG: 1 INJECTION, SOLUTION INTRAMUSCULAR; INTRAVENOUS; SUBCUTANEOUS at 15:49

## 2025-02-27 RX ADMIN — PHENYLEPHRINE HYDROCHLORIDE 100 MCG: 10 INJECTION INTRAVENOUS at 15:26

## 2025-02-27 RX ADMIN — ACETAMINOPHEN 975 MG: 325 TABLET ORAL at 14:55

## 2025-02-27 ASSESSMENT — ACTIVITIES OF DAILY LIVING (ADL)
ADLS_ACUITY_SCORE: 18
ADLS_ACUITY_SCORE: 32
ADLS_ACUITY_SCORE: 21
ADLS_ACUITY_SCORE: 21
ADLS_ACUITY_SCORE: 24
ADLS_ACUITY_SCORE: 21
ADLS_ACUITY_SCORE: 24

## 2025-02-27 NOTE — INTERVAL H&P NOTE
No interval changes  Negative urine culture   Plan for bladder stone removal  Possible admit on CBI if urine is red  Need outlet procedure at later date  Discussed risks of bleeding, infection, damage to bladder wall, incomplete stone removal

## 2025-02-27 NOTE — ANESTHESIA PREPROCEDURE EVALUATION
Anesthesia Pre-Procedure Evaluation    Patient: Paul Esposito   MRN: [id number] : 1950        Procedure : Procedure(s):  CYSTOLITHOLAPAXY with possible laser          Past Medical History:   Diagnosis Date    Benign localized prostatic hyperplasia with lower urinary tract symptoms (LUTS)     Bladder stone     Cervical neuritis     History of blood transfusion 1951    Migraine headache       Past Surgical History:   Procedure Laterality Date    CYSTOSCOPY W/ LITHOLAPAXY / EHL N/A 2019    Procedure: CYSTOLITHOLAPAXY WITH HOLMIUM LASER, BLADDER STONE EXTRACTION, WESTFALL PLACEMENT;  Surgeon: Moises Martini MD;  Location: [facility];  Service: Urology    HERNIA REPAIR      INGUINAL HERNIA REPAIR Right 2019    Procedure: RIGHT INGUINAL HERNIA REPAIR;  Surgeon: Anil Andres MD;  Location: [facility];  Service: General    KIDNEY STONE SURGERY      VASECTOMY  1982      No Known Allergies   Social History     Tobacco Use    Smoking status: Former     Current packs/day: 0.00     Average packs/day: 0.5 packs/day for 10.0 years (5.0 ttl pk-yrs)     Types: Cigarettes     Quit date: 2003     Years since quittin.1     Passive exposure: Past    Smokeless tobacco: Never   Substance Use Topics    Alcohol use: No      Wt Readings from Last 1 Encounters:   25 70 kg (154 lb 4.8 oz)        Anesthesia Evaluation   Pt has had prior anesthetic. Type: General.    No history of anesthetic complications       ROS/MED HX  ENT/Pulmonary:  - neg pulmonary ROS     Neurologic:  - neg neurologic ROS     Cardiovascular:  - neg cardiovascular ROS     METS/Exercise Tolerance: >4 METS    Hematologic:  - neg hematologic  ROS     Musculoskeletal:  - neg musculoskeletal ROS     GI/Hepatic:       Renal/Genitourinary:     (+)       Nephrolithiasis ,       Endo:       Psychiatric/Substance Use:  - neg psychiatric ROS     Infectious Disease:       Malignancy:       Other:       "      Physical Exam    Airway        Mallampati: II   TM distance: > 3 FB   Neck ROM: full   Mouth opening: > 3 cm    Respiratory Devices and Support         Dental       (+) Minor Abnormalities - some fillings, tiny chips      Cardiovascular   cardiovascular exam normal          Pulmonary   pulmonary exam normal                OUTSIDE LABS:  CBC:   Lab Results   Component Value Date    WBC 7.8 02/15/2025    WBC 4.9 03/25/2019    HGB 16.0 02/15/2025    HGB 15.1 05/03/2019    HCT 44.0 02/15/2025    HCT 50.7 03/25/2019     02/15/2025     03/25/2019     BMP:   Lab Results   Component Value Date     02/15/2025     02/12/2025    POTASSIUM 4.1 02/15/2025    POTASSIUM 4.0 02/12/2025    CHLORIDE 102 02/15/2025    CHLORIDE 102 02/12/2025    CO2 22 02/15/2025    CO2 26 02/12/2025    BUN 12.1 02/15/2025    BUN 14.5 02/12/2025    CR 1.16 02/15/2025    CR 0.88 02/12/2025     (H) 02/15/2025    GLC 77 02/12/2025     COAGS: No results found for: \"PTT\", \"INR\", \"FIBR\"  POC: No results found for: \"BGM\", \"HCG\", \"HCGS\"  HEPATIC:   Lab Results   Component Value Date    ALBUMIN 4.2 03/25/2019    PROTTOTAL 6.9 03/25/2019    ALT 24 03/25/2019    AST 32 03/25/2019    ALKPHOS 72 03/25/2019    BILITOTAL 1.7 (H) 03/25/2019     OTHER:   Lab Results   Component Value Date    A1C 5.1 03/25/2019    ELEN 10.6 (H) 02/15/2025       Anesthesia Plan    ASA Status:  2    NPO Status:  NPO Appropriate    Anesthesia Type: General.     - Airway: ETT   Induction: Intravenous.   Maintenance: Balanced.        Consents    Anesthesia Plan(s) and associated risks, benefits, and realistic alternatives discussed. Questions answered and patient/representative(s) expressed understanding.     - Discussed: Risks, Benefits and Alternatives for BOTH SEDATION and the PROCEDURE were discussed     - Discussed with:  Patient            Postoperative Care    Pain management: IV analgesics, Oral pain medications.   PONV prophylaxis: Ondansetron " (or other 5HT-3)     Comments:               Todd Tsai MD    I have reviewed the pertinent notes and labs in the chart from the past 30 days and (re)examined the patient.  Any updates or changes from those notes are reflected in this note.    Clinically Significant Risk Factors Present on Admission

## 2025-02-27 NOTE — DISCHARGE INSTRUCTIONS
Caring for Yourself after Bladder Stone removal    For the first 24 hours:  - do not drive of operate any heavy equipment  -do not make complex decisions of sign legal documents  -do not drink alcohol  -ask for supervision when cooking, caring for infants or doing other activities that could cause harm to others  -you may have some nausea and vomiting    As you heal:  - start eating slowly. Start with sips of liquids, then add solid food when ou can handle it. If you do not feel like eating solids, have liquids.  -drink up to 2.5 to 3 liters per day (10 to 12 cups). Having plenty of fluids will hep you stay hydrated. This can make peeing more comfortable. It will also keep blood clots from forming in your bladder.  -if you were taking prostate medicines before surgery, you may stop taking them (unless we tell you differently).  -if you had to stop taking blood thinners before surgery, your care team will tell you when to start taking then again    Urinating:  -You are going home with the following tubes or drains: harding catheter.  Nurse/ to write care and instructions.  Treat the catheter like an extension of your body; do not let it get caught or snagged on anything.  Leave the catheter in place until your follow up.  - Catheter to be removed in clinic on 2/28/2025    After the catheter is removed  -for the next few days, you may feel stinging or burning when you pee. This type of pain is not usually helped by narcotic pain medicine  -you may feel a sense of urgency to reach the bathroom    Physical activity:  -you can resume normal physical activity (such as walking, flights of stars, driving) after surgery  -wait 2 weeks before doing aerobic activities    Follow up exam:  I will do a visit next week with you to talk about next steps regarding prostate and the bladder stone and high parathyroid hormone level    When should I call the [facility]?  Call the urology clinic if your have any of these signs and  symptoms:  -not able to pee. If you are not able to reach a nurse or doctor within 60 minutes, go to the emergency room  -fever over 101.5 F (38.6 C) along with sweats and chills  -bright red blood in urine or large blood clots that make it hard to pee  -bad pain that doesn't get better with pain medicines  -leg pain  -nausea or vomiting that is bad or lasts beyond that first day    How do I call the clinic?  [phone]  Open Monday through Friday, 7am to 7pm.  If calling after hours, ask for the urologist on call

## 2025-02-27 NOTE — BRIEF OP NOTE
[facility] Brief Operative Note    Pre-operative diagnosis: Bladder stone [N21.0]   Post-operative diagnosis * No post-op diagnosis entered *   Procedure: Procedure(s):  CYSTOLITHOLAPAXY with laser   Surgeon: Vargas Westbrook MD   Assistants(s): none   Estimated blood loss: Less than 10 ml    Specimens: Bladder stone for analysis   Findings: 2 large bladder stones, trilobar hypertrophy with intravesical median lobe, very dense stone used laser to fragment and remove. 20 Wolof 2-way catheter placed in end. No CBI.

## 2025-02-27 NOTE — ANESTHESIA CARE TRANSFER NOTE
Patient: Paul Esposito    Procedure: Procedure(s):  CYSTOLITHOLAPAXY with laser       Diagnosis: Bladder stone [N21.0]  Diagnosis Additional Information: No value filed.    Anesthesia Type:   General     Note:    Oropharynx: oropharynx clear of all foreign objects and spontaneously breathing  Level of Consciousness: awake  Oxygen Supplementation: face mask  Level of Supplemental Oxygen (L/min / FiO2): 8  Independent Airway: airway patency satisfactory and stable  Dentition: dentition unchanged  Vital Signs Stable: post-procedure vital signs reviewed and stable  Report to RN Given: handoff report given  Patient transferred to: PACU  Comments: Patient agitated and confused. Report given to bedside RN. Anesthesiologist at bedside. No additional orders at this time.  Handoff Report: Identifed the Patient, Identified the Reponsible Provider, Reviewed the pertinent medical history, Discussed the surgical course, Reviewed Intra-OP anesthesia mangement and issues during anesthesia, Set expectations for post-procedure period and Allowed opportunity for questions and acknowledgement of understanding      Vitals:  Vitals Value Taken Time   /87 1703 2/27/25   Temp 97.0 1703 2/27/25      Pulse 77 1703 2/27/25      Resp 16 1703 2/27/25      SpO2 98 1703 2/27/25          Electronically Signed By: CELESTINO Reilly CRNA  February 27, 2025  5:06 PM

## 2025-02-28 NOTE — OP NOTE
Operative Report  2/27/2025    PREOPERATIVE DIAGNOSIS:  Bladder Stone  POSTOPERATIVE DIAGNOSIS: Same as above    PROCEDURE PERFORMED:   Cystolithopaxy of bladder stone, complex (4 cm)    STAFF SURGEON: Vargas Westbrook MD was present and participatory for the entire case.   RESIDENT(S): None    FINDINGS: Has trilobar hypertrophy with intravesical median lobe and 2 large hard bladder stones, bladder with erythema in posterior wall from the stones and moderate to severe trabeculations noted    ANESTHESIA: General  INTRAVENOUS FLUIDS: See anesthesia records  ESTIMATED BLOOD LOSS: Less than 10 ml   SPECIMENS:Bladder Stone  DRAINS: 20 Tajik 2-way catheter with 20 ml in balloon     INDICATIONS FOR PROCEDURE: Paul Esposito is a(n) 74 year old male who was seen in consultation for bladder stones.  He has history of bladder stones removed in 2019. He came in for hematuria and found to have 2 2cm x 2 cm bladder stones. I suspect he also has outlet obstruction, however the stones are bothering him quite a bit with urinary frequency and urgency. He has elected for treatment with bladder stone removal and then outlet procedure at later date. After discussion of the risks, benefits and alternatives of the procedure, the patient agreed to proceed with the above stated procdure.    DESCRIPTION OF PROCEDURE: After obtaining informed consent, the patient was taken to the operating room and placed under general anesthesia.  He was repositioned in dorsal lithotomy making sure that the legs were positioned and padded safely.  He was then prepped and draped in standard sterile fashion.  Culture directed antibiotics were administered and bilateral sequential compression devices were placed.  A time out was performed confirming the appropriate patient identity and planned procedure.     24 fr sheath continuous flow scope was inserted. There was trilobar hypertrophy.  The bladder had erythema in the posterior wall consistent with  irritation from the stones.  There was 2 large stones in the bladder.  The ureteral orifice ease were in orthotopic position.  There was some moderate to severe trabeculation.    I then switched to the nephroscope and I inserted the trilogy probe. I started fragmenting the stone with the trilogy probe which proceeded slowly due to the very dense stone.  Eventually the probe broke so I opted to switch to the laser fragmentation.  I switched to the laser bridge and I used a 550 mg holmium laser fiber at settings of 1 J to 2 J at 20 Hz to carefully fragment the stone.  Given the dense stone and the large volume of stone this took considerable mount of time and eventually I was able to break the stone into small pieces.    Once this was done I washed out the pieces through the sheath.  This required several attempts of fill and drain to wash out all the pieces.  I then examined the bladder once more and there were no other significant sizable pieces remaining.  The ureteral orifices were orthotopic and unharmed.      There is reasonably good hemostasis with only mild bleeding from the bladder neck area.  Therefore I opted to place a 20 Greenlandic two-way catheter and I inflated the balloon with 20 mL.  I hand irrigated the catheter several times and the urine was only pink-tinged with no significant clot.  The patient was then awakened and transferred to PACU for recovery.     POSTOPERATIVE PLAN:   Will plan to void trial tomorrow morning.  I think he will need an outlet procedure either laser nucleation of the prostate or transurethral resection of the prostate.  It is not too big of the prostate however it does seem to be causing obstruction given the bladder stone formation as well as the trabeculations in the bladder.  Of note he the patient did have hypercalcemia and I obtained a PTH that was also elevated and this is suggestive of hyperparathyroidism.  I will discuss this findings with him in the next steps next  week.

## 2025-02-28 NOTE — ANESTHESIA POSTPROCEDURE EVALUATION
Patient: Paul Esposito    Procedure: Procedure(s):  CYSTOLITHOLAPAXY with laser       Anesthesia Type:  General    Note:  Disposition: Outpatient   Postop Pain Control: Uneventful            Sign Out: Well controlled pain   PONV: No   Neuro/Psych: Uneventful            Sign Out: Acceptable/Baseline neuro status   Airway/Respiratory: Uneventful            Sign Out: Acceptable/Baseline resp. status   CV/Hemodynamics: Uneventful            Sign Out: Acceptable CV status; No obvious hypovolemia; No obvious fluid overload   Other NRE: NONE   DID A NON-ROUTINE EVENT OCCUR? No           Last vitals:  Vitals Value Taken Time   /74 02/27/25 1800   Temp 36.7  C (98  F) 02/27/25 1800   Pulse 77 02/27/25 1803   Resp 13 02/27/25 1803   SpO2 97 % 02/27/25 1803   Vitals shown include unfiled device data.    Electronically Signed By: Vikram John MD  February 27, 2025  7:01 PM

## 2025-02-28 NOTE — PHARMACY-ADMISSION MEDICATION HISTORY
Pharmacist Admission Medication History    Admission medication history is complete. The information provided in this note is only as accurate as the sources available at the time of the update.    Information Source(s): Patient and CareEverywhere/SureScripts via in-person    Pertinent Information:     Changes made to PTA medication list:  Added: None  Deleted: None  Changed: None    Allergies reviewed with patient and updates made in EHR: yes    Medication History Completed By: Kelly Willams RPH 2/27/2025 7:32 PM

## 2025-03-02 ENCOUNTER — TELEPHONE (OUTPATIENT)
Dept: SURGERY | Facility: CLINIC | Age: 75
End: 2025-03-02
Payer: COMMERCIAL

## 2025-03-02 DIAGNOSIS — E83.52 HYPERCALCEMIA: Primary | ICD-10-CM

## 2025-03-02 DIAGNOSIS — R79.89 ELEVATED PARATHYROID HORMONE: ICD-10-CM

## 2025-03-04 LAB
APPEARANCE STONE: NORMAL
COMPN STONE: NORMAL
SPECIMEN WT: 1070 MG

## 2025-03-10 ENCOUNTER — TELEPHONE (OUTPATIENT)
Dept: UROLOGY | Facility: CLINIC | Age: 75
End: 2025-03-10
Payer: COMMERCIAL

## 2025-03-10 NOTE — TELEPHONE ENCOUNTER
Message left for patient to call back to schedule for a virtual visit in 2 months with Dr Westbrook.  Ericka Palomo RN

## 2025-03-10 NOTE — TELEPHONE ENCOUNTER
[facility] Call Center    Phone Message    May a detailed message be left on voicemail: yes     Reason for Call: Appointment Intake    Referring Provider Name: Dariana  Diagnosis and/or Symptoms: 2 mo VV    Writer was not able to pull up a VV in the 2 month time frame mentioned in chart. Please call pt to schedule. Thank you!    Action Taken: Message routed to:  [facility]    Travel Screening: Not Applicable     Date of Service:

## 2025-03-18 ENCOUNTER — HOSPITAL ENCOUNTER (OUTPATIENT)
Dept: NUCLEAR MEDICINE | Facility: CLINIC | Age: 75
Setting detail: NUCLEAR MEDICINE
Discharge: HOME | End: 2025-03-18
Attending: STUDENT IN AN ORGANIZED HEALTH CARE EDUCATION/TRAINING PROGRAM
Payer: COMMERCIAL

## 2025-03-18 DIAGNOSIS — E83.52 HYPERCALCEMIA: ICD-10-CM

## 2025-03-18 DIAGNOSIS — R79.89 ELEVATED PARATHYROID HORMONE: ICD-10-CM

## 2025-03-18 PROCEDURE — A9500 TC99M SESTAMIBI: HCPCS | Performed by: STUDENT IN AN ORGANIZED HEALTH CARE EDUCATION/TRAINING PROGRAM

## 2025-03-18 PROCEDURE — 78072 PARATHYRD PLANAR W/SPECT&CT: CPT

## 2025-03-18 PROCEDURE — 343N000001 HC RX 343 MED OP 636: Performed by: STUDENT IN AN ORGANIZED HEALTH CARE EDUCATION/TRAINING PROGRAM

## 2025-03-18 PROCEDURE — A9516 IODINE I-123 SOD IODIDE MIC: HCPCS | Performed by: STUDENT IN AN ORGANIZED HEALTH CARE EDUCATION/TRAINING PROGRAM

## 2025-03-18 RX ADMIN — Medication 26.6 MILLICURIE: at 12:16

## 2025-03-18 RX ADMIN — Medication 591 MICROCURIE: at 09:03

## 2025-03-24 ENCOUNTER — TELEPHONE (OUTPATIENT)
Dept: SURGERY | Facility: CLINIC | Age: 75
End: 2025-03-24
Payer: COMMERCIAL

## 2025-03-24 ENCOUNTER — TELEPHONE (OUTPATIENT)
Dept: ENDOCRINOLOGY | Facility: CLINIC | Age: 75
End: 2025-03-24
Payer: COMMERCIAL

## 2025-03-24 NOTE — TELEPHONE ENCOUNTER
----- Message from Stefan Soliz sent at 3/21/2025  4:24 PM CDT -----  Regarding: FW: hyperparathyroid question  Please see message below.    Thank you,  Stefan Soliz MD  Endocrinology, Diabetes and Metabolism  [facility]  ----- Message -----  From: Vargas Westbrook MD  Sent: 3/19/2025  10:23 PM CDT  To: Stefan Soliz MD  Subject: hyperparathyroid question                        Hi    I saw this pt for stones and I think he has hyperparathyroid, elevated PTH, high calcium, and I got NM scan that shows concern for adenoma    I put in referral and it seems got scheduled with you in  October 2025    Is there anything I can do in mean time for next 7 months to prevent any sequelae of condition    Thanks  Lonny

## 2025-03-24 NOTE — TELEPHONE ENCOUNTER
Scheduled patient with MR per request from Dr. Anaya:    Please have this patient see Dr. Lemon for hyperparathyroidism.  Pt has kidney stones and is referred from Dr. Westbrook, urology.     LV     Called and left number for call back to get that scheduled with MR. Ashley Shabazz RN  [facility]  General Surgery  [address] [place] [id number]  Office:[phone]  Employed by [facility]

## 2025-03-25 NOTE — TELEPHONE ENCOUNTER
Second attempt to reach patient to schedule appointment with MR per request from Dr. Anaya:     Please have this patient see Dr. Lemon for hyperparathyroidism.  Pt has kidney stones and is referred from Dr. Westbrook, urology.     LV     Called and left number for call back to get that scheduled with MR. Will also attempt to reach patient via [facility].    Ashley Shabazz RN  [facility]  General Surgery  [address] [place] [id number]  Office:[phone]  Employed by [facility]

## 2025-03-26 NOTE — TELEPHONE ENCOUNTER
Writer has message out to Dr. Soliz to confirm timeframe for office visit.    Will await her response.      Chen Alcala RN  Endocrine Care Coordinator  [facility]

## 2025-03-31 ENCOUNTER — TELEPHONE (OUTPATIENT)
Dept: SURGERY | Facility: CLINIC | Age: 75
End: 2025-03-31
Payer: COMMERCIAL

## 2025-03-31 ENCOUNTER — MYC MEDICAL ADVICE (OUTPATIENT)
Dept: UROLOGY | Facility: CLINIC | Age: 75
End: 2025-03-31
Payer: COMMERCIAL

## 2025-03-31 DIAGNOSIS — E83.52 HYPERCALCEMIA: Primary | ICD-10-CM

## 2025-03-31 DIAGNOSIS — E21.3 HYPERPARATHYROIDISM, UNSPECIFIED: ICD-10-CM

## 2025-04-01 ENCOUNTER — PATIENT OUTREACH (OUTPATIENT)
Dept: CARE COORDINATION | Facility: CLINIC | Age: 75
End: 2025-04-01
Payer: COMMERCIAL

## 2025-04-01 NOTE — TELEPHONE ENCOUNTER
Dr. Soliz will review further with referring provider for workup.       Chen Alcala, RN  Endocrine Care Coordinator  [facility]

## 2025-04-02 NOTE — TELEPHONE ENCOUNTER
Per Dr. Soliz:  Given his hx of kidney stones, ok to schedule him earlier within 3 month. Thank you Chen for your help.   Dr. Westbrook please complete work up below, it would be helpful to have them before his visit.     Thank you,   Stefan Soliz MD   Endocrinology, Diabetes and Metabolism   [facility]           Writer will send to Clinic Coordinators to assist in scheduling an appointment in the next 3 months for:  Diagnosis   E83.52 (ICD-10-CM) - Hypercalcemia   R79.89 (ICD-10-CM) - Elevated parathyroid hormone         Chen Alcala RN  Endocrine Care Coordinator  [facility]

## 2025-04-03 ENCOUNTER — TELEPHONE (OUTPATIENT)
Dept: ENDOCRINOLOGY | Facility: CLINIC | Age: 75
End: 2025-04-03
Payer: COMMERCIAL

## 2025-04-03 NOTE — TELEPHONE ENCOUNTER
Left Voicemail (1st Attempt) for the patient to call back and schedule the following:    Appointment type: New endocrine   Provider: Heydi   Return date: within 3 months if possible   Specialty phone number: [phone]   Additional appointment(s) needed:   Additonal Notes: LVM, MyC x1   Hi Renée!    I think it would be ok with Dr. Soliz, Dr. Downs, Dr. Ha, or Dr. Mccormack.    Dr. Soliz did say she would be ok if you used two returns in a row for her if that helps too (she is in clinic with me now).    Thank you!  Chen     Examples:   -4/14 [place] virtual [facility] (in hold slots for chen)  -5/14 [place] virtual [facility] (can use 2 back to back regular slots per provider if still avail)  -6/17 [place] in person mg  -6/18 [place] in person mg    Please note that the above appointment(s) will require manual scheduling as they are marked as STEPHON and will not appear using auto search. Do not schedule the patient if another patient has already been scheduled in the requested appointment slot.     Suzanne Echeverria on 4/3/2025 at 9:39 AM

## (undated) DEVICE — GOWN XLG DISP 9545

## (undated) DEVICE — SOL WATER IRRIG 3000ML BAG 2B7117

## (undated) DEVICE — SUCTION MANIFOLD NEPTUNE 2 SYS 1 PORT 702-025-000

## (undated) DEVICE — SOL WATER IRRIG 1000ML BOTTLE 2F7114

## (undated) DEVICE — CONTAINER URINE SPEC 4OZ STRL 1053

## (undated) DEVICE — CATH FOLEY 20FR 5ML LUBRICATH LATEX 0165L20

## (undated) DEVICE — LASER FIBER HOLMIUM MOSES 550 D/F/L AC-10030120

## (undated) DEVICE — Device

## (undated) DEVICE — KIT ENDO FIRST STEP DISINFECTANT 200ML W/POUCH EP-4

## (undated) DEVICE — GLOVE BIOGEL PI ULTRATOUCH G SZ 7.0 42170

## (undated) DEVICE — DRAPE SHEET REV FOLD 3/4 9349

## (undated) DEVICE — PREP DYNA-HEX 4% CHG SCRUB 4OZ BOTTLE MDS098710

## (undated) DEVICE — CUSTOM PACK CYSTO PREFERRED SOT5BCYHEA

## (undated) DEVICE — TUBING SET THERMEDX UROLOGY SGL USE LL0006

## (undated) DEVICE — SYR 50ML CATH TIP W/O NDL 309620

## (undated) DEVICE — TUBING SUCTION MEDI-VAC 1/4"X20' N620A

## (undated) DEVICE — BAG URINARY DRAIN 2000ML LF 154002

## (undated) DEVICE — MAT FLOOR WATERPROOF BACKSHEET FMBP30

## (undated) RX ORDER — FUROSEMIDE 10 MG/ML
INJECTION INTRAMUSCULAR; INTRAVENOUS
Status: DISPENSED
Start: 2025-02-27

## (undated) RX ORDER — LIDOCAINE HYDROCHLORIDE 10 MG/ML
INJECTION, SOLUTION EPIDURAL; INFILTRATION; INTRACAUDAL; PERINEURAL
Status: DISPENSED
Start: 2025-02-27

## (undated) RX ORDER — FENTANYL CITRATE 50 UG/ML
INJECTION, SOLUTION INTRAMUSCULAR; INTRAVENOUS
Status: DISPENSED
Start: 2025-02-27